# Patient Record
Sex: FEMALE | Race: WHITE | NOT HISPANIC OR LATINO | Employment: UNEMPLOYED | URBAN - METROPOLITAN AREA
[De-identification: names, ages, dates, MRNs, and addresses within clinical notes are randomized per-mention and may not be internally consistent; named-entity substitution may affect disease eponyms.]

---

## 2017-01-03 ENCOUNTER — ALLSCRIPTS OFFICE VISIT (OUTPATIENT)
Dept: OTHER | Facility: OTHER | Age: 62
End: 2017-01-03

## 2017-01-31 ENCOUNTER — GENERIC CONVERSION - ENCOUNTER (OUTPATIENT)
Dept: OTHER | Facility: OTHER | Age: 62
End: 2017-01-31

## 2017-04-10 ENCOUNTER — ALLSCRIPTS OFFICE VISIT (OUTPATIENT)
Dept: OTHER | Facility: OTHER | Age: 62
End: 2017-04-10

## 2017-04-10 ENCOUNTER — HOSPITAL ENCOUNTER (OUTPATIENT)
Dept: RADIOLOGY | Facility: CLINIC | Age: 62
Discharge: HOME | End: 2017-04-10

## 2017-04-10 DIAGNOSIS — M25.561 PAIN IN RIGHT KNEE: ICD-10-CM

## 2017-04-10 DIAGNOSIS — M25.562 PAIN IN LEFT KNEE: ICD-10-CM

## 2017-04-10 DIAGNOSIS — M25.551 PAIN IN RIGHT HIP: ICD-10-CM

## 2017-04-10 DIAGNOSIS — M25.519 PAIN IN SHOULDER: ICD-10-CM

## 2017-04-10 PROCEDURE — 73564 X-RAY EXAM KNEE 4 OR MORE: CPT

## 2017-04-10 PROCEDURE — 73502 X-RAY EXAM HIP UNI 2-3 VIEWS: CPT

## 2017-05-02 ENCOUNTER — ALLSCRIPTS OFFICE VISIT (OUTPATIENT)
Dept: OTHER | Facility: OTHER | Age: 62
End: 2017-05-02

## 2017-05-02 ENCOUNTER — HOSPITAL ENCOUNTER (OUTPATIENT)
Dept: RADIOLOGY | Facility: CLINIC | Age: 62
Discharge: HOME | End: 2017-05-02
Payer: COMMERCIAL

## 2017-05-02 DIAGNOSIS — M25.519 PAIN IN SHOULDER: ICD-10-CM

## 2017-05-02 PROCEDURE — 73030 X-RAY EXAM OF SHOULDER: CPT

## 2017-05-26 ENCOUNTER — ALLSCRIPTS OFFICE VISIT (OUTPATIENT)
Dept: OTHER | Facility: OTHER | Age: 62
End: 2017-05-26

## 2017-05-26 ENCOUNTER — LAB REQUISITION (OUTPATIENT)
Dept: LAB | Facility: HOSPITAL | Age: 62
End: 2017-05-26
Payer: COMMERCIAL

## 2017-05-26 DIAGNOSIS — E03.9 HYPOTHYROIDISM: ICD-10-CM

## 2017-05-26 DIAGNOSIS — I10 ESSENTIAL (PRIMARY) HYPERTENSION: ICD-10-CM

## 2017-05-26 DIAGNOSIS — M79.7 FIBROMYALGIA: ICD-10-CM

## 2017-05-26 DIAGNOSIS — E78.5 HYPERLIPIDEMIA: ICD-10-CM

## 2017-05-26 DIAGNOSIS — J30.9 ALLERGIC RHINITIS: ICD-10-CM

## 2017-05-26 DIAGNOSIS — F32.9 MAJOR DEPRESSIVE DISORDER, SINGLE EPISODE: ICD-10-CM

## 2017-05-26 LAB
25(OH)D3 SERPL-MCNC: 21.5 NG/ML (ref 30–100)
ALBUMIN SERPL BCP-MCNC: 4.6 G/DL (ref 3.5–5)
ALP SERPL-CCNC: 70 U/L (ref 46–116)
ALT SERPL W P-5'-P-CCNC: 32 U/L (ref 12–78)
ANION GAP SERPL CALCULATED.3IONS-SCNC: 4 MMOL/L (ref 4–13)
AST SERPL W P-5'-P-CCNC: 18 U/L (ref 5–45)
BACTERIA UR QL AUTO: ABNORMAL /HPF
BASOPHILS # BLD AUTO: 0.04 THOUSANDS/ΜL (ref 0–0.1)
BASOPHILS NFR BLD AUTO: 1 % (ref 0–1)
BILIRUB SERPL-MCNC: 0.37 MG/DL (ref 0.2–1)
BILIRUB UR QL STRIP: NEGATIVE
BUN SERPL-MCNC: 18 MG/DL (ref 5–25)
CALCIUM ALBUM COR SERPL-MCNC: 9.7 MG/DL (ref 8.3–10.1)
CALCIUM SERPL-MCNC: 10.2 MG/DL (ref 8.3–10.1)
CHLORIDE SERPL-SCNC: 105 MMOL/L (ref 100–108)
CHOLEST SERPL-MCNC: 336 MG/DL (ref 50–200)
CLARITY UR: CLEAR
CO2 SERPL-SCNC: 28 MMOL/L (ref 21–32)
COLOR UR: YELLOW
CREAT SERPL-MCNC: 0.77 MG/DL (ref 0.6–1.3)
EOSINOPHIL # BLD AUTO: 0.51 THOUSAND/ΜL (ref 0–0.61)
EOSINOPHIL NFR BLD AUTO: 7 % (ref 0–6)
ERYTHROCYTE [DISTWIDTH] IN BLOOD BY AUTOMATED COUNT: 13 % (ref 11.6–15.1)
GFR SERPL CREATININE-BSD FRML MDRD: >60 ML/MIN/1.73SQ M
GLUCOSE P FAST SERPL-MCNC: 94 MG/DL (ref 65–99)
GLUCOSE UR STRIP-MCNC: NEGATIVE MG/DL
HCT VFR BLD AUTO: 40 % (ref 34.8–46.1)
HDLC SERPL-MCNC: 51 MG/DL (ref 40–60)
HGB BLD-MCNC: 13.4 G/DL (ref 11.5–15.4)
HGB UR QL STRIP.AUTO: ABNORMAL
HYALINE CASTS #/AREA URNS LPF: ABNORMAL /LPF
KETONES UR STRIP-MCNC: NEGATIVE MG/DL
LDLC SERPL CALC-MCNC: 239 MG/DL (ref 0–100)
LEUKOCYTE ESTERASE UR QL STRIP: ABNORMAL
LYMPHOCYTES # BLD AUTO: 2.09 THOUSANDS/ΜL (ref 0.6–4.47)
LYMPHOCYTES NFR BLD AUTO: 28 % (ref 14–44)
MCH RBC QN AUTO: 31.3 PG (ref 26.8–34.3)
MCHC RBC AUTO-ENTMCNC: 33.5 G/DL (ref 31.4–37.4)
MCV RBC AUTO: 94 FL (ref 82–98)
MONOCYTES # BLD AUTO: 0.83 THOUSAND/ΜL (ref 0.17–1.22)
MONOCYTES NFR BLD AUTO: 11 % (ref 4–12)
NEUTROPHILS # BLD AUTO: 3.87 THOUSANDS/ΜL (ref 1.85–7.62)
NEUTS SEG NFR BLD AUTO: 53 % (ref 43–75)
NITRITE UR QL STRIP: NEGATIVE
NON-SQ EPI CELLS URNS QL MICRO: ABNORMAL /HPF
NRBC BLD AUTO-RTO: 0 /100 WBCS
PH UR STRIP.AUTO: 6 [PH] (ref 4.5–8)
PLATELET # BLD AUTO: 444 THOUSANDS/UL (ref 149–390)
PMV BLD AUTO: 10.5 FL (ref 8.9–12.7)
POTASSIUM SERPL-SCNC: 5 MMOL/L (ref 3.5–5.3)
PROT SERPL-MCNC: 7.7 G/DL (ref 6.4–8.2)
PROT UR STRIP-MCNC: NEGATIVE MG/DL
RBC # BLD AUTO: 4.28 MILLION/UL (ref 3.81–5.12)
RBC #/AREA URNS AUTO: ABNORMAL /HPF
SODIUM SERPL-SCNC: 137 MMOL/L (ref 136–145)
SP GR UR STRIP.AUTO: 1.02 (ref 1–1.03)
T4 FREE SERPL-MCNC: 1.26 NG/DL (ref 0.76–1.46)
TRIGL SERPL-MCNC: 232 MG/DL
TSH SERPL DL<=0.05 MIU/L-ACNC: 0.73 UIU/ML (ref 0.36–3.74)
UROBILINOGEN UR QL STRIP.AUTO: 0.2 E.U./DL
WBC # BLD AUTO: 7.36 THOUSAND/UL (ref 4.31–10.16)
WBC #/AREA URNS AUTO: ABNORMAL /HPF

## 2017-05-26 PROCEDURE — 80061 LIPID PANEL: CPT | Performed by: FAMILY MEDICINE

## 2017-05-26 PROCEDURE — 80053 COMPREHEN METABOLIC PANEL: CPT | Performed by: FAMILY MEDICINE

## 2017-05-26 PROCEDURE — 82306 VITAMIN D 25 HYDROXY: CPT | Performed by: FAMILY MEDICINE

## 2017-05-26 PROCEDURE — 85025 COMPLETE CBC W/AUTO DIFF WBC: CPT | Performed by: FAMILY MEDICINE

## 2017-05-26 PROCEDURE — 84443 ASSAY THYROID STIM HORMONE: CPT | Performed by: FAMILY MEDICINE

## 2017-05-26 PROCEDURE — 84439 ASSAY OF FREE THYROXINE: CPT | Performed by: FAMILY MEDICINE

## 2017-05-26 PROCEDURE — 81001 URINALYSIS AUTO W/SCOPE: CPT | Performed by: FAMILY MEDICINE

## 2017-05-30 ENCOUNTER — GENERIC CONVERSION - ENCOUNTER (OUTPATIENT)
Dept: OTHER | Facility: OTHER | Age: 62
End: 2017-05-30

## 2017-08-28 ENCOUNTER — ALLSCRIPTS OFFICE VISIT (OUTPATIENT)
Dept: OTHER | Facility: OTHER | Age: 62
End: 2017-08-28

## 2017-08-28 DIAGNOSIS — E78.5 HYPERLIPIDEMIA: ICD-10-CM

## 2017-08-31 ENCOUNTER — APPOINTMENT (OUTPATIENT)
Dept: LAB | Facility: HOSPITAL | Age: 62
End: 2017-08-31
Payer: COMMERCIAL

## 2017-08-31 ENCOUNTER — ALLSCRIPTS OFFICE VISIT (OUTPATIENT)
Dept: OTHER | Facility: OTHER | Age: 62
End: 2017-08-31

## 2017-08-31 DIAGNOSIS — E78.5 HYPERLIPIDEMIA: ICD-10-CM

## 2017-08-31 LAB
ALBUMIN SERPL BCP-MCNC: 4.1 G/DL (ref 3.5–5)
ALP SERPL-CCNC: 57 U/L (ref 46–116)
ALT SERPL W P-5'-P-CCNC: 29 U/L (ref 12–78)
AST SERPL W P-5'-P-CCNC: 19 U/L (ref 5–45)
BILIRUB DIRECT SERPL-MCNC: 0.12 MG/DL (ref 0–0.2)
BILIRUB SERPL-MCNC: 0.28 MG/DL (ref 0.2–1)
CHOLEST SERPL-MCNC: 234 MG/DL (ref 50–200)
HDLC SERPL-MCNC: 38 MG/DL (ref 40–60)
LDLC SERPL CALC-MCNC: 157 MG/DL (ref 0–100)
PROT SERPL-MCNC: 7.1 G/DL (ref 6.4–8.2)
TRIGL SERPL-MCNC: 194 MG/DL

## 2017-08-31 PROCEDURE — 36415 COLL VENOUS BLD VENIPUNCTURE: CPT

## 2017-08-31 PROCEDURE — 80076 HEPATIC FUNCTION PANEL: CPT

## 2017-08-31 PROCEDURE — 80061 LIPID PANEL: CPT

## 2017-09-01 ENCOUNTER — GENERIC CONVERSION - ENCOUNTER (OUTPATIENT)
Dept: OTHER | Facility: OTHER | Age: 62
End: 2017-09-01

## 2018-01-10 NOTE — RESULT NOTES
Discussion/Summary   Phone call to patient discussed lab results  Cholesterol significantly elevated at 336 and triglycerides are mildly elevated at 232  Vitamin D is decreased  Recommend patient start Lipitor 20 mg daily and increase vitamin D an additional 1000 international units daily  Recheck fasting labs next appointment  Verified Results  (1) CBC/PLT/DIFF 16GIT1423 11:45AM Aniya Champion Order Number: [id number]     Test Name Result Flag Reference   WBC COUNT 7 36 Thousand/uL  4 31-10 16   RBC COUNT 4 28 Million/uL  3 81-5 12   HEMOGLOBIN 13 4 g/dL  11 5-15 4   HEMATOCRIT 40 0 %  34 8-46  1   MCV 94 fL  82-98   MCH 31 3 pg  26 8-34 3   MCHC 33 5 g/dL  31 4-37 4   RDW 13 0 %  11 6-15 1   MPV 10 5 fL  8 9-12 7   PLATELET COUNT 149 Thousands/uL H 149-390   nRBC AUTOMATED 0 /100 WBCs     NEUTROPHILS RELATIVE PERCENT 53 %  43-75   LYMPHOCYTES RELATIVE PERCENT 28 %  14-44   MONOCYTES RELATIVE PERCENT 11 %  4-12   EOSINOPHILS RELATIVE PERCENT 7 % H 0-6   BASOPHILS RELATIVE PERCENT 1 %  0-1   NEUTROPHILS ABSOLUTE COUNT 3 87 Thousands/? ??L  1 85-7 62   LYMPHOCYTES ABSOLUTE COUNT 2 09 Thousands/? ??L  0 60-4 47   MONOCYTES ABSOLUTE COUNT 0 83 Thousand/? ??L  0 17-1 22   EOSINOPHILS ABSOLUTE COUNT 0 51 Thousand/? ??L  0 00-0 61   BASOPHILS ABSOLUTE COUNT 0 04 Thousands/? ??L  0 00-0 10     (1) COMPREHENSIVE METABOLIC PANEL 47TQA0301 51:08DM Aniya Champion Order Number: [id number]     Test Name Result Flag Reference   SODIUM 137 mmol/L  136-145   POTASSIUM 5 0 mmol/L  3 5-5 3   CHLORIDE 105 mmol/L  100-108   CARBON DIOXIDE 28 mmol/L  21-32   ANION GAP (CALC) 4 mmol/L  4-13   BLOOD UREA NITROGEN 18 mg/dL  5-25   CREATININE 0 77 mg/dL  0 60-1 30   Standardized to IDMS reference method   CALCIUM 10 2 mg/dL H 8 3-10 1   BILI, TOTAL 0 37 mg/dL  0 20-1 00   ALK PHOSPHATAS 70 U/L     ALT (SGPT) 32 U/L  12-78   AST(SGOT) 18 U/L  5-45   ALBUMIN 4 6 g/dL  3 5-5 0   TOTAL PROTEIN 7 7 g/dL 6  4-8 2   eGFR Non-African American      >60 0 ml/min/1 73sq m   [facility] Disease Education Program recommendations are as follows:  GFR calculation is accurate only with a steady state creatinine  Chronic Kidney disease less than 60 ml/min/1 73 sq  meters  Kidney failure less than 15 ml/min/1 73 sq  meters  CORRECTED CALCIUM 9 7 mg/dL  8 3-10 1   GLUCOSE FASTING 94 mg/dL  65-99     (1) LIPID PANEL, FASTING 26KML7564 11:45AM Comfort Bryant Order Number: [id number]     Test Name Result Flag Reference   CHOLESTEROL 336 mg/dL H    HDL,DIRECT 51 mg/dL  40-60   Specimen collection should occur prior to Metamizole administration due to the potential for falsely depressed results  LDL CHOLESTEROL CALCULATED 239 mg/dL H 0-100   Triglyceride:         Normal              <150 mg/dl       Borderline High    150-199 mg/dl       High               200-499 mg/dl       Very High          >499 mg/dl  Cholesterol:         Desirable        <200 mg/dl      Borderline High  200-239 mg/dl      High             >239 mg/dl  HDL Cholesterol:        High    >59 mg/dL      Low     <41 mg/dL  LDL CALCULATED:    This screening LDL is a calculated result  It does not have the accuracy of the Direct Measured LDL in the monitoring of patients with hyperlipidemia and/or statin therapy  Direct Measure LDL (FIW382) must be ordered separately in these patients  TRIGLYCERIDES 232 mg/dL H <=150   Specimen collection should occur prior to N-Acetylcysteine or Metamizole administration due to the potential for falsely depressed results       (1) T4, FREE 66VGM6426 11:45AM Comfort Bryant Order Number: [id number]     Test Name Result Flag Reference   T4,FREE 1 26 ng/dL  0 76-1 46     (1) TSH 02MNK6771 11:45AM Comfort Bryant Order Number: [id number]     Test Name Result Flag Reference   TSH 0 735 uIU/mL  0 358-3 740   Patients undergoing fluorescein dye angiography may retain small amounts of fluorescein in the body for 48-72 hours post procedure  Samples containing fluorescein can produce falsely depressed TSH values  If the patient had this procedure,a specimen should be resubmitted post fluorescein clearance  The recommended reference ranges for TSH during pregnancy are as follows:  First trimester 0 1 to 2 5 uIU/mL  Second trimester  0 2 to 3 0 uIU/mL  Third trimester 0 3 to 3 0 uIU/m     (1) VITAMIN D 25-HYDROXY 25YBL8402 11:45AM [employer] Order Number: [id number]     Test Name Result Flag Reference   VIT D 25-HYDROX 21 5 ng/mL L 30 0-100 0   This assay is a certified procedure of the CDC Vitamin D Standardization Certification Program (VDSCP)     Deficiency <20ng/ml   Insufficiency 20-30ng/ml   Sufficient  ng/ml     *Patients undergoing fluorescein dye angiography may retain small amounts of fluorescein in the body for 48-72 hours post procedure  Samples containing fluorescein can produce falsely elevated Vitamin D values  If the patient had this procedure, a specimen should be resubmitted post fluorescein clearance       (1) URINALYSIS w URINE C/S REFLEX (will reflex a microscopy if leukocytes, occult blood, or nitrites are not within normal limits) 60OYM1254 11:45AM [employer] Order Number: [id number]     Test Name Result Flag Reference   COLOR Yellow     CLARITY Clear     PH UA 6 0  4 5-8 0   LEUKOCYTE ESTERASE UA Small A Negative   NITRITE UA Negative  Negative   PROTEIN UA Negative mg/dl  Negative   GLUCOSE UA Negative mg/dl  Negative   KETONES UA Negative mg/dl  Negative   UROBILINOGEN UA 0 2 E U /dl  0 2, 1 0 E U /dl   BILIRUBIN UA Negative  Negative   BLOOD UA Small A Negative   SPECIFIC GRAVITY UA 1 020  1 003-1 030   BACTERIA None Seen /hpf  None Seen, Occasional   EPITHELIAL CELLS None Seen /hpf  None Seen, Occasional   HYALINE CASTS None Seen /lpf  None Seen   RBC UA 4-10 /hpf A None Seen   WBC UA 4-10 /hpf A None Seen Plan  Hyperlipidemia    · Atorvastatin Calcium 20 MG Oral Tablet; TAKE 1 TABLET DAILY

## 2018-01-12 VITALS
DIASTOLIC BLOOD PRESSURE: 86 MMHG | SYSTOLIC BLOOD PRESSURE: 142 MMHG | RESPIRATION RATE: 16 BRPM | BODY MASS INDEX: 32.78 KG/M2 | TEMPERATURE: 98.4 F | HEART RATE: 72 BPM | WEIGHT: 200 LBS

## 2018-01-12 VITALS
HEIGHT: 66 IN | BODY MASS INDEX: 32.38 KG/M2 | SYSTOLIC BLOOD PRESSURE: 132 MMHG | DIASTOLIC BLOOD PRESSURE: 76 MMHG | WEIGHT: 201.5 LBS | HEART RATE: 78 BPM

## 2018-01-12 NOTE — RESULT NOTES
Message  PPD placed 8/28/2017  PPD read:  8/31/2017-Negative        Plan  Hyperlipidemia    · (1) HEPATIC FUNCTION PANEL; Status: In Progress - Specimen/Data Collected;   Done:  40SUL4465   · (1) LIPID PANEL, FASTING; Status: In Progress - Specimen/Data Collected;   Done:  05Elc1715  Physical examination of employee    · Administered: PPD    Signatures   Electronically signed by : Dennis Carrion, ; Aug 31 2017  8:24AM EST                       (Author)

## 2018-01-13 VITALS
DIASTOLIC BLOOD PRESSURE: 79 MMHG | BODY MASS INDEX: 32.32 KG/M2 | WEIGHT: 201.13 LBS | SYSTOLIC BLOOD PRESSURE: 144 MMHG | HEART RATE: 67 BPM | HEIGHT: 66 IN

## 2018-01-13 NOTE — PROGRESS NOTES
Assessment    1  Hypertension (401 9) (I10)   2  Hyperlipidemia (272 4) (E78 5)   3  Hypothyroidism (244 9) (E03 9)   4  Fibromyalgia (729 1) (M79 7)   5  Depression (311) (F32 9)   6  Insomnia (780 52) (G47 00)    Plan  Health Maintenance    · Renae Ericksonchris Lincoln  (Obstetrics/Gynecology) Physician Referral  Consult  Status: Hold  For - Scheduling  Requested for: 20Jan2016  Care Summary provided  : Yes  Hyperlipidemia    · A diet that is low in fat, cholesterol, and sodium is considered a cardiac diet ;  Status:Complete;   Done: 41HRL4446   · Eat no more than 30 grams of fat per day ; Status:Complete;   Done: 39EJT3216  Hypertension    · Begin a limited exercise program ; Status:Complete;   Done: 04KCG0648   · Continue with our present treatment plan ; Status:Complete;   Done: 39EZX0937   · Eat a low fat and low cholesterol diet ; Status:Complete;   Done: 73TLH0112   · Keep a diary of when and what you eat ; Status:Complete;   Done: 03RHC9923   · Restrict the salt in your diet by avoiding highly salted foods ; Status:Complete;   Done:  62PGV1317   · Restrict your sodium (salt) intake to 2 grams per day ; Status:Complete;   Done:  78HOQ8677   · Take your blood pressure twice a week  Record the numbers and bring them with you to  your appointments ; Status:Complete;   Done: 71WEV3675   · There are many exercise options for seniors ; Status:Complete;   Done: 80TOM9203   · Call [phone] if: You become dizzy or lightheaded, especially when you stand up  after sitting for a while ; Status:Complete;   Done: 70OSI8246   · Call [phone] if: Your blood pressure is frequently higher than 140/90 ;  Status:Complete;   Done: 66HFJ8887   · Call [phone] if: You experience a new kind of chest pain (angina) or pressure ;  Status:Complete;   Done: 42CRS7375    Discussion/Summary  Discussion Summary:   Patient given prescription for fasting labs to include CBC, CMP, lipid profile, TSH, free T4, vitamin D and UA   Patient being referred to Dr Carolanne Apgar, gynecologist for routine exam  Patient to continue present treatment  She is instructed to follow a low-fat and a low-salt diet continue her regular exercise walking 4 days a week for 40 minutes  Patient return the office in 6 months  History of Present Illness  HPI: Patient is a 70-year-old female who is a new patient to our practice being seen at her initial visit  She recently moved back to this area in 2015 from [place] and has been a patient of Dr Jaja Vargas at [facility] since  Patient last had fasting labs proximal was 6 months ago  Patient has a history of hypertension the past 20 years, fibromyalgia the past 30 years and hypothyroidism for the past 2 years  Patient has hyperlipidemia increase and treated with Lipitor  Patient also suffers from depression and insomnia as well as arthritis  Patient previously followed with her rheumatologist but not recently  Patient does see gynecologist yearly and requests referral to one this time and for her yearly mammogram  Patient last colonoscopy was in   Patient did receive yearly flu vaccine in 2015, had Pneumovax in  and Zostavax in 2015  Patient admits to chronic low back pain and bilateral hip pain  She continues to walk 4 days a week for 40 minutes  Review of Systems  Complete-Female:   Constitutional: feeling tired  Cardiovascular: no chest pain, no intermittent leg claudication, no palpitations and no lower extremity edema  Respiratory: no shortness of breath and no shortness of breath during exertion  Gastrointestinal: Occasional heartburn, but No complaints of abdominal pain, no constipation, no nausea or vomiting, no diarrhea, no bloody stools  Genitourinary: Positive urinary urgency and slight incontinence at night , but no dysuria  Musculoskeletal: arthralgias  Neurological: no headache, no dizziness and no fainting        Vitals  Vital Signs Luis Alberto Solano Includes: Current Encounter]    Recorded: 19ZQD5013 03:12PM   Temperature 98 3 F   Heart Rate 60   Respiration 16   Systolic 075   Diastolic 72   Height 5 ft 6 in   Weight 200 lb    BMI Calculated 32 28   BSA Calculated 2     Physical Exam    Constitutional   General appearance: No acute distress, well appearing and well nourished  Eyes   Conjunctiva and lids: No swelling, erythema or discharge  Ears, Nose, Mouth, and Throat   External inspection of ears and nose: Normal     Otoscopic examination: Tympanic membranes translucent with normal light reflex  Canals patent without erythema  Nasal mucosa, septum, and turbinates: Normal without edema or erythema  Oropharynx: Normal with no erythema, edema, exudate or lesions  Pulmonary   Respiratory effort: No increased work of breathing or signs of respiratory distress  Auscultation of lungs: Clear to auscultation  Cardiovascular   Auscultation of heart: Normal rate and rhythm, normal S1 and S2, without murmurs  Examination of extremities for edema and/or varicosities: Normal     Carotid pulses: Normal     Abdomen   Abdomen: Non-tender, no masses  Lymphatic   Palpation of lymph nodes in neck: No lymphadenopathy  Musculoskeletal   Gait and station: Normal     Inspection/palpation of joints, bones, and muscles: Normal     Skin   Skin and subcutaneous tissue: Normal without rashes or lesions      Psychiatric   Orientation to person, place, and time: Normal     Mood and affect: Normal          Signatures   Electronically signed by : CHIQUITA Betancourt DO; Jan 20 2016  3:19PM EST                       (Author)

## 2018-01-15 VITALS
BODY MASS INDEX: 31.98 KG/M2 | RESPIRATION RATE: 16 BRPM | WEIGHT: 199 LBS | HEART RATE: 72 BPM | HEIGHT: 66 IN | SYSTOLIC BLOOD PRESSURE: 138 MMHG | TEMPERATURE: 99.8 F | DIASTOLIC BLOOD PRESSURE: 70 MMHG

## 2018-01-15 NOTE — PROGRESS NOTES
Assessment    1  Physical examination of employee (V70 5) (Z02 89)    Plan  Hyperlipidemia    · (1) HEPATIC FUNCTION PANEL; Status:Active; Requested for:00Opd1749;    · (1) LIPID PANEL, FASTING; Status:Active; Requested for:06Acz6848;   Physical examination of employee    · PPD; INJECT 0 1  ML Intradermal; To Be Done: 12Tql7557    Discussion/Summary  health maintenance visit healthy adult female Currently, she eats an adequate diet and has an inadequate exercise regimen  The risks and benefits of immunizations were discussed  Advice and education were given regarding nutrition, aerobic exercise, weight bearing exercise and weight loss  PPD skin test applied  Form completed for physical exam  Patient to return to the office in 48-72 hours to read PPD skin test and due for fasting lipid and liver profile  Possible side effects of new medications were reviewed with the patient/guardian today  The treatment plan was reviewed with the patient/guardian  The patient/guardian understands and agrees with the treatment plan      Chief Complaint  Work Physical      History of Present Illness  HM, Adult Female: The patient is being seen for a health maintenance evaluation  General Health: The patient's health since the last visit is described as good  She has regular dental visits  She denies vision problems  Vision care includes wearing glasses  She has hearing loss  hearing is slightly decreased  Immunizations status: up to date  Lifestyle:  She does not have a healthy diet  She has weight concerns  She does not exercise regularly  She does not use tobacco  She consumes alcohol  She reports occasional alcohol use  She denies drug use  Reproductive health: the patient is postmenopausal    Screening: cancer screening reviewed and current  metabolic screening reviewed and current  HPI: 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       Active Problems    1   Acute bronchitis (466 0) (J20 9)   2  Allergic rhinitis (477 9) (J30 9)   3  Arthritis (716 90) (M19 90)   4  Colon cancer screening (V76 51) (Z12 11)   5  Depression (311) (F32 9)   6  Eczema (692 9) (L30 9)   7  Fibromyalgia (729 1) (M79 7)   8  Glenohumeral arthritis, left (715 91) (M19 012)   9  Hyperlipidemia (272 4) (E78 5)   10  Hypertension (401 9) (I10)   11  Hypothyroidism (244 9) (E03 9)   12  Insomnia (780 52) (G47 00)   13  Left knee pain (719 46) (M25 562)   14  Primary localized osteoarthritis of left knee (715 16) (M17 12)   15  Primary localized osteoarthritis of right knee (715 16) (M17 11)   16  Right hip pain (719 45) (M25 551)   17  Right knee pain (719 46) (M25 561)   18  Screening breast examination (V76 10) (Z12 31)   19  Shoulder pain (719 41) (M25 519)   20  Subacromial bursitis (726 19) (M75 50)    Past Medical History    · Depression (311) (F32 9)   · History of thyroid disease (V12 29) (Z86 39)   · Hypertension (401 9) (I10)    Surgical History    · History of Peripheral Nerve Block Wrist Median Left   · History of Spine Repair    Family History  Mother    · Family history of lung cancer (V16 1) (Z80 1)  Father    · Family history of skin cancer (V16 8) (Z80 8)    Social History    · Former smoker (V15 82) (N26 654)    Current Meds   1  Atorvastatin Calcium 20 MG Oral Tablet; TAKE 1 TABLET DAILY; Therapy: 98XTD2658 to ([facility])  Requested for: 37EWL3394; Last   Rx:75Rbg9692 Ordered   2  Azelastine HCl - 0 15 % Nasal Solution; INSERT 2 SQUIRTS IN EACH NOSTRIL TWICE   DAILY; Therapy: 12OPF1480 to (Last Rx:21Nxb9916)  Requested for: 39ENA7572 Ordered   3  Diclofenac Sodium 75 MG Oral Tablet Delayed Release; TAKE 1 TABLET BY MOUTH   TWICE A DAY AS NEEDED FOR PAIN with food; Therapy: 45Oba5259 to (Evaluate:47Gvl2567)  Requested for: 46UFM1573; Last   Rx:87Faf4443 Ordered   4  Fenofibrate 160 MG Oral Tablet; take 1 tablet every day;    Therapy: 31RUR9871 to (Evaluate:62Xcs0834) Requested for: 67Pxt7316; Last   Rx:21May2017; Status: ACTIVE - Renewal Denied Ordered   5  Levothyroxine Sodium 75 MCG Oral Tablet; take 1 tablet by mouth once daily; Therapy: 82FWM9803 to (NKPCHFED:54ZGK9894)  Requested for: 14WTF3904; Last   Rx:08Eto9546 Ordered   6  Lisinopril 10 MG Oral Tablet; take 1 tablet every day; Therapy: 87OGI8248 to (Evaluate:19Aug2017)  Requested for: 27Lvq7169; Last   Rx:21May2017; Status: ACTIVE - Renewal Denied Ordered   7  Lyrica 75 MG Oral Capsule; TAKE 1 CAPSULE TWICE DAILY; Therapy: 00WXO7074 to (Evaluate:02Nov2017); Last Rx:04Aug2017 Ordered   8  Omeprazole 20 MG Oral Capsule Delayed Release; TAKE 1 CAPSULE EVERY DAY; Therapy: 71HMZ1819 to ([facility])  Requested for: 88SPL0918; Last   Rx:10May2017 Ordered   9  Rosuvastatin Calcium 10 MG Oral Tablet; TAKE 1 TABLET BY MOUTH ONCE DAILY; Therapy: 10CGL7369 to (Last Rx:05Jun2017)  Requested for: 58VJG9971 Ordered   10  Stool Softener TABS; Therapy: (Recorded:20Jan2016) to Recorded   11  TraMADol HCl - 50 MG Oral Tablet; Take 1 tablet every 6 hours as needed for pain; Therapy: 59QMF3058 to (Evaluate:28Aug2017)  Requested for: 25Hdm6534; Last    Rx:11Mky4978 Ordered   12  Triamcinolone Acetonide 0 5 % External Cream; APPLY TO THE AFFECTED AREAS 3    TIMES DAILY AS NEEDED; Therapy: 08Apr2016 to (Last Rx:12Oct2016)  Requested for: 12Oct2016 Ordered   13  Venlafaxine HCl  MG Oral Capsule Extended Release 24 Hour; take 1 capsule    daily; Therapy: 63LSP7695 to (Evaluate:13Nov2017)  Requested for: 21WCN2259; Last    Rx:89Fxh1358 Ordered   14  Vitamin B-12 TABS; Therapy: (Recorded:10Apr2017) to Recorded   15  Vitamin D 1000 UNIT CAPS; Therapy: (Recorded:73Aps8371) to Recorded   16  Zolpidem Tartrate 10 MG Oral Tablet; TAKE 1 TABLET AT  BEDTIME AS NEEDED FOR    INSOMNIA; Last Rx:46Ygz5143 Ordered    Allergies    1   No Known Drug Allergies    Vitals   Recorded: 41Hbz3371 05:03PM Recorded: 28Aug2017 04: 40PM   Temperature  97 7 F   Heart Rate 72    Respiration 16    Systolic  157   Diastolic  78   Height  5 ft 5 5 in   Weight  204 lb    BMI Calculated  33 43   BSA Calculated  2 01     Physical Exam    Constitutional   General appearance: No acute distress, well appearing and well nourished  Eyes   Conjunctiva and lids: No swelling, erythema or discharge  Ears, Nose, Mouth, and Throat   External inspection of ears and nose: Normal     Otoscopic examination: Tympanic membranes translucent with normal light reflex  Canals patent without erythema  Oropharynx: Normal with no erythema, edema, exudate or lesions  Pulmonary   Respiratory effort: No increased work of breathing or signs of respiratory distress  Auscultation of lungs: Clear to auscultation  Cardiovascular   Auscultation of heart: Normal rate and rhythm, normal S1 and S2, without murmurs  Examination of extremities for edema and/or varicosities: Normal     Abdomen   Abdomen: Non-tender, no masses  Lymphatic   Palpation of lymph nodes in neck: No lymphadenopathy  Musculoskeletal   Gait and station: Normal     Inspection/palpation of joints, bones, and muscles: Normal     Skin   Skin and subcutaneous tissue: Normal without rashes or lesions  Psychiatric   Orientation to person, place, and time: Normal     Mood and affect: Normal        Health Management  Colon cancer screening   COLONOSCOPY; every 10 years; Next Due: 26YWC0422; Overdue  Screening breast examination   MAMMO DIAGNOSTIC BILATERAL W CAD; every 1 year; Last 12QCC5707; Next Due:  80YGO6920;  Overdue    Signatures   Electronically signed by : Robbie Renteria DO; Aug 28 2017  5:05PM EST                       (Author)

## 2018-01-15 NOTE — MISCELLANEOUS
Message   Recorded as Task   Date: 03/02/2016 02:53 PM, Created By: Lana Alicia   Task Name: Call Back   Assigned To: Amalia Stanford   Regarding Patient: Trae Vega, Status: Active   Comment:    Enid Lopez - 02 Mar 2016 2:53 PM     TASK CREATED  Caller: Self; Results Inquiry; [phone] (Mobile Phone); [phone] (Home)  pt notified of results and recommendations  She states you had discussed with her changing her thyroid medication  Do you still want to do this? Amalia Stanford - 04 Mar 2016 8:53 AM     TASK EDITED  Phone call to patient  She complains of feeling fatigued and sweaty and has been taking [place] thyroid for about 1 year  Recommend she switch to Synthroid 75 ÃÂµg daily and repeat TSH and free T4 in 6 weeks          Signatures   Electronically signed by : CHIQUITA Haro D ,DO; Mar  4 2016  8:53AM EST                       (Author)

## 2018-01-18 NOTE — RESULT NOTES
Verified Results  (1) LIPID PANEL, FASTING [id number] 09:01AM Jess Has     Test Name Result Flag Reference   CHOLESTEROL, TOTAL 254 mg/dL H 125-200   HDL CHOLESTEROL 58 mg/dL  > OR = 46   TRIGLICERIDES 745 mg/dL  <150   LDL-CHOLESTEROL 169 mg/dL (calc) H <130   Desirable range <100 mg/dL for patients with CHD or  diabetes and <70 mg/dL for diabetic patients with  known heart disease  CHOL/HDLC RATIO 4 4 (calc)  < OR = 5 0   NON HDL CHOLESTEROL 196 mg/dL (calc) H    Target for non-HDL cholesterol is 30 mg/dL higher than   LDL cholesterol target  (1) COMPREHENSIVE METABOLIC PANEL [id number] 97:26FN Jess Has     Test Name Result Flag Reference   GLUCOSE 113 mg/dL H 65-99   Fasting reference interval   UREA NITROGEN (BUN) 21 mg/dL  7-25   CREATININE 0 72 mg/dL  0 50-0 99   For patients >52years of age, the reference limit  for Creatinine is approximately 13% higher for people  identified as -American  eGFR NON-AFR   AMERICAN 90 mL/min/1 73m2  > OR = 60   eGFR AFRICAN AMERICAN 105 mL/min/1 73m2  > OR = 60   BUN/CREATININE RATIO   7-61   NOT APPLICABLE (calc)   SODIUM 141 mmol/L  135-146   POTASSIUM 4 5 mmol/L  3 5-5 3   CHLORIDE 105 mmol/L     CARBON DIOXIDE 27 mmol/L  19-30   CALCIUM 10 0 mg/dL  8 6-10 4   PROTEIN, TOTAL 7 2 g/dL  6 1-8 1   ALBUMIN 4 8 g/dL  3 6-5 1   GLOBULIN 2 4 g/dL (calc)  1 9-3 7   ALBUMIN/GLOBULIN RATIO 2 0 (calc)  1 0-2 5   BILIRUBIN, TOTAL 0 4 mg/dL  0 2-1 2   ALKALINE PHOSPHATASE 51 U/L     AST 22 U/L  10-35   ALT 25 U/L  6-29     (1) CBC/PLT/DIFF [id number] 09:01AM Jess Has     Test Name Result Flag Reference   WHITE BLOOD CELL COUNT 4 9 Thousand/uL  3 8-10 8   RED BLOOD CELL COUNT 4 23 Million/uL  3 80-5 10   HEMOGLOBIN 12 7 g/dL  11 7-15 5   HEMATOCRIT 38 8 %  35 0-45 0   MCV 91 6 fL  80 0-100 0   MCH 30 0 pg  27 0-33 0   MCHC 32 8 g/dL  32 0-36 0   RDW 13 4 %  11 0-15 0   PLATELET COUNT 492 Thousand/uL  140-400   MPV 8 4 fL  7 5-11 5 ABSOLUTE NEUTROPHILS 2822 cells/uL  2207-8760   ABSOLUTE LYMPHOCYTES 1509 cells/uL  850-3900   ABSOLUTE MONOCYTES 368 cells/uL  200-950   ABSOLUTE EOSINOPHILS 176 cells/uL     ABSOLUTE BASOPHILS 25 cells/uL  0-200   NEUTROPHILS 57 6 %     LYMPHOCYTES 30 8 %     MONOCYTES 7 5 %     EOSINOPHILS 3 6 %     BASOPHILS 0 5 %       (1) T4, FREE [id number] 09:01AM Rishabh Parks     Test Name Result Flag Reference   T4, FREE 0 8 ng/dL  0 8-1 8     (Q) TSH, 3RD GENERATION [id number] 09:01AM Rishabh Parks     Test Name Result Flag Reference   TSH 3 69 mIU/L  0 40-4 50     *(Q) VITAMIN D, 25-HYDROXY, LC/MS/MS [id number] 09:01AM Rishabh Parks     Test Name Result Flag Reference   VITAMIN D, 25-OH, TOTAL 34 ng/mL     Vitamin D Status         25-OH Vitamin D:     Deficiency:                    <20 ng/mL  Insufficiency:             20 - 29 ng/mL  Optimal:                 > or = 30 ng/mL     For 25-OH Vitamin D testing on patients on   D2-supplementation and patients for whom quantitation   of D2 and D3 fractions is required, the QuestAssureD(TM)  25-OH VIT D, (D2,D3), LC/MS/MS is recommended: order   code 87335 (patients >2yrs)  For more information on this test, go to:  http://Urbita/faq/SPC689  (This link is being provided for   informational/educational purposes only )     (Q) URINALYSIS REFLEX [id number] 09:01AM Rishabh Parks   REPORT COMMENT:  FASTING:YES     Test Name Result Flag Reference   COLOR YELLOW  YELLOW   APPEARANCE CLEAR  CLEAR   SPECIFIC GRAVITY 1 015  1 001-1 035   PH 5 0  5 0-8 0   GLUCOSE NEGATIVE  NEGATIVE   BILIRUBIN NEGATIVE  NEGATIVE   KETONES NEGATIVE  NEGATIVE   OCCULT BLOOD NEGATIVE  NEGATIVE   PROTEIN NEGATIVE  NEGATIVE   NITRITE NEGATIVE  NEGATIVE   LEUKOCYTE ESTERASE NEGATIVE  NEGATIVE       Discussion/Summary   Cholesterol is elevated and fasting blood sugar is slightly elevated   Recommend patient resume Lipitor and continue present treatment and follow a low-fat/low-cholesterol and a low sugar/carbohydrate diet more carefully  Other labs okay

## 2018-01-22 VITALS
SYSTOLIC BLOOD PRESSURE: 130 MMHG | HEIGHT: 66 IN | TEMPERATURE: 97.7 F | WEIGHT: 204 LBS | HEART RATE: 72 BPM | DIASTOLIC BLOOD PRESSURE: 78 MMHG | RESPIRATION RATE: 16 BRPM | BODY MASS INDEX: 32.78 KG/M2

## 2018-01-31 DIAGNOSIS — M19.90 OSTEOARTHRITIS, UNSPECIFIED OSTEOARTHRITIS TYPE, UNSPECIFIED SITE: Primary | ICD-10-CM

## 2018-01-31 RX ORDER — DICLOFENAC SODIUM 75 MG/1
TABLET, DELAYED RELEASE ORAL
Qty: 60 TABLET | Refills: 0 | Status: SHIPPED | OUTPATIENT
Start: 2018-01-31 | End: 2018-04-23 | Stop reason: SDUPTHER

## 2018-02-25 DIAGNOSIS — F32.A DEPRESSION, UNSPECIFIED DEPRESSION TYPE: Primary | ICD-10-CM

## 2018-02-25 PROBLEM — J30.9 ALLERGIC RHINITIS: Status: ACTIVE | Noted: 2017-05-26

## 2018-02-25 PROBLEM — M19.012 GLENOHUMERAL ARTHRITIS, LEFT: Status: ACTIVE | Noted: 2017-05-02

## 2018-02-25 PROBLEM — M17.12 PRIMARY LOCALIZED OSTEOARTHRITIS OF LEFT KNEE: Status: ACTIVE | Noted: 2017-04-10

## 2018-02-25 PROBLEM — M17.11 PRIMARY LOCALIZED OSTEOARTHRITIS OF RIGHT KNEE: Status: ACTIVE | Noted: 2017-04-10

## 2018-02-25 RX ORDER — VENLAFAXINE HYDROCHLORIDE 150 MG/1
CAPSULE, EXTENDED RELEASE ORAL
Qty: 90 CAPSULE | Refills: 0 | Status: SHIPPED | OUTPATIENT
Start: 2018-02-25 | End: 2018-03-06 | Stop reason: SDUPTHER

## 2018-03-06 DIAGNOSIS — F32.A DEPRESSION, UNSPECIFIED DEPRESSION TYPE: ICD-10-CM

## 2018-03-06 DIAGNOSIS — M79.7 FIBROMYALGIA: Primary | ICD-10-CM

## 2018-03-06 DIAGNOSIS — I10 ESSENTIAL HYPERTENSION: ICD-10-CM

## 2018-03-06 RX ORDER — LISINOPRIL 10 MG/1
10 TABLET ORAL DAILY
Qty: 90 TABLET | Refills: 0 | Status: SHIPPED | OUTPATIENT
Start: 2018-03-06 | End: 2018-06-11 | Stop reason: SDUPTHER

## 2018-03-06 RX ORDER — TRAMADOL HYDROCHLORIDE 50 MG/1
50 TABLET ORAL EVERY 6 HOURS PRN
Qty: 30 TABLET | Refills: 0 | Status: SHIPPED | OUTPATIENT
Start: 2018-03-06 | End: 2018-04-09 | Stop reason: SDUPTHER

## 2018-03-06 RX ORDER — TRAMADOL HYDROCHLORIDE 50 MG/1
50 TABLET ORAL EVERY 6 HOURS PRN
Refills: 2 | COMMUNITY
Start: 2018-01-27 | End: 2018-03-06 | Stop reason: SDUPTHER

## 2018-03-06 RX ORDER — VENLAFAXINE HYDROCHLORIDE 150 MG/1
150 CAPSULE, EXTENDED RELEASE ORAL DAILY
Qty: 90 CAPSULE | Refills: 0 | Status: SHIPPED | OUTPATIENT
Start: 2018-03-06 | End: 2018-06-18 | Stop reason: SDUPTHER

## 2018-03-06 RX ORDER — LISINOPRIL 10 MG/1
1 TABLET ORAL DAILY
COMMUNITY
Start: 2016-11-20 | End: 2018-03-06 | Stop reason: SDUPTHER

## 2018-03-06 RX ORDER — PREGABALIN 75 MG/1
1 CAPSULE ORAL 2 TIMES DAILY
COMMUNITY
Start: 2016-03-04 | End: 2018-03-06 | Stop reason: SDUPTHER

## 2018-03-06 RX ORDER — PREGABALIN 75 MG/1
75 CAPSULE ORAL 2 TIMES DAILY
Qty: 180 CAPSULE | Refills: 0 | Status: SHIPPED | OUTPATIENT
Start: 2018-03-06 | End: 2019-10-07

## 2018-04-09 DIAGNOSIS — M79.7 FIBROMYALGIA: ICD-10-CM

## 2018-04-09 RX ORDER — TRAMADOL HYDROCHLORIDE 50 MG/1
50 TABLET ORAL EVERY 6 HOURS PRN
Qty: 30 TABLET | Refills: 0 | Status: SHIPPED | OUTPATIENT
Start: 2018-04-09 | End: 2018-06-11 | Stop reason: SDUPTHER

## 2018-04-23 DIAGNOSIS — M19.90 OSTEOARTHRITIS, UNSPECIFIED OSTEOARTHRITIS TYPE, UNSPECIFIED SITE: ICD-10-CM

## 2018-04-23 RX ORDER — DICLOFENAC SODIUM 75 MG/1
TABLET, DELAYED RELEASE ORAL
Qty: 180 TABLET | Refills: 0 | Status: SHIPPED | OUTPATIENT
Start: 2018-04-23 | End: 2018-09-20 | Stop reason: SDUPTHER

## 2018-04-23 RX ORDER — DICLOFENAC SODIUM 75 MG/1
TABLET, DELAYED RELEASE ORAL
Qty: 60 TABLET | Refills: 0 | Status: SHIPPED | OUTPATIENT
Start: 2018-04-23 | End: 2018-04-23 | Stop reason: SDUPTHER

## 2018-06-11 DIAGNOSIS — M79.7 FIBROMYALGIA: ICD-10-CM

## 2018-06-11 DIAGNOSIS — I10 ESSENTIAL HYPERTENSION: ICD-10-CM

## 2018-06-11 RX ORDER — TRAMADOL HYDROCHLORIDE 50 MG/1
50 TABLET ORAL EVERY 6 HOURS PRN
Qty: 30 TABLET | Refills: 0 | Status: SHIPPED | OUTPATIENT
Start: 2018-06-11 | End: 2018-08-13 | Stop reason: SDUPTHER

## 2018-06-11 RX ORDER — LISINOPRIL 10 MG/1
10 TABLET ORAL DAILY
Qty: 90 TABLET | Refills: 0 | Status: SHIPPED | OUTPATIENT
Start: 2018-06-11 | End: 2018-09-20 | Stop reason: SDUPTHER

## 2018-06-18 DIAGNOSIS — F32.A DEPRESSION, UNSPECIFIED DEPRESSION TYPE: ICD-10-CM

## 2018-06-18 RX ORDER — VENLAFAXINE HYDROCHLORIDE 150 MG/1
150 CAPSULE, EXTENDED RELEASE ORAL DAILY
Qty: 90 CAPSULE | Refills: 3 | Status: SHIPPED | OUTPATIENT
Start: 2018-06-18 | End: 2019-07-22 | Stop reason: SDUPTHER

## 2018-07-09 DIAGNOSIS — E03.9 HYPOTHYROIDISM, UNSPECIFIED TYPE: Primary | ICD-10-CM

## 2018-07-09 DIAGNOSIS — G47.00 INSOMNIA, UNSPECIFIED TYPE: ICD-10-CM

## 2018-07-09 RX ORDER — ZOLPIDEM TARTRATE 10 MG/1
1 TABLET ORAL
COMMUNITY
End: 2018-07-09 | Stop reason: SDUPTHER

## 2018-07-09 RX ORDER — LEVOTHYROXINE SODIUM 0.07 MG/1
1 TABLET ORAL DAILY
COMMUNITY
Start: 2016-03-04 | End: 2018-07-09 | Stop reason: SDUPTHER

## 2018-07-09 RX ORDER — ZOLPIDEM TARTRATE 10 MG/1
10 TABLET ORAL
Qty: 90 TABLET | Refills: 0 | Status: SHIPPED | OUTPATIENT
Start: 2018-07-09 | End: 2018-10-01 | Stop reason: SDUPTHER

## 2018-07-09 RX ORDER — LEVOTHYROXINE SODIUM 0.07 MG/1
75 TABLET ORAL DAILY
Qty: 90 TABLET | Refills: 3 | Status: SHIPPED | OUTPATIENT
Start: 2018-07-09 | End: 2019-10-16

## 2018-08-13 DIAGNOSIS — M79.7 FIBROMYALGIA: ICD-10-CM

## 2018-08-13 DIAGNOSIS — L30.9 ECZEMA, UNSPECIFIED TYPE: Primary | ICD-10-CM

## 2018-08-13 RX ORDER — TRIAMCINOLONE ACETONIDE 5 MG/G
CREAM TOPICAL AS NEEDED
Qty: 30 G | Refills: 0 | Status: SHIPPED | OUTPATIENT
Start: 2018-08-13

## 2018-08-13 RX ORDER — TRIAMCINOLONE ACETONIDE 5 MG/G
CREAM TOPICAL
COMMUNITY
Start: 2016-04-08 | End: 2018-08-13 | Stop reason: SDUPTHER

## 2018-08-13 RX ORDER — TRAMADOL HYDROCHLORIDE 50 MG/1
50 TABLET ORAL EVERY 6 HOURS PRN
Qty: 30 TABLET | Refills: 0 | Status: SHIPPED | OUTPATIENT
Start: 2018-08-13 | End: 2019-10-07

## 2018-09-20 DIAGNOSIS — I10 ESSENTIAL HYPERTENSION: ICD-10-CM

## 2018-09-20 DIAGNOSIS — M19.90 OSTEOARTHRITIS, UNSPECIFIED OSTEOARTHRITIS TYPE, UNSPECIFIED SITE: ICD-10-CM

## 2018-09-20 RX ORDER — LISINOPRIL 10 MG/1
10 TABLET ORAL DAILY
Qty: 90 TABLET | Refills: 3 | Status: SHIPPED | OUTPATIENT
Start: 2018-09-20 | End: 2019-07-22 | Stop reason: SDUPTHER

## 2018-09-20 RX ORDER — DICLOFENAC SODIUM 75 MG/1
TABLET, DELAYED RELEASE ORAL
Qty: 180 TABLET | Refills: 3 | Status: SHIPPED | OUTPATIENT
Start: 2018-09-20 | End: 2019-10-07

## 2018-10-01 DIAGNOSIS — G47.00 INSOMNIA, UNSPECIFIED TYPE: ICD-10-CM

## 2018-10-01 RX ORDER — ZOLPIDEM TARTRATE 10 MG/1
10 TABLET ORAL
Qty: 90 TABLET | Refills: 0 | OUTPATIENT
Start: 2018-10-01 | End: 2018-12-28 | Stop reason: SDUPTHER

## 2018-12-28 DIAGNOSIS — G47.00 INSOMNIA, UNSPECIFIED TYPE: ICD-10-CM

## 2018-12-28 RX ORDER — ZOLPIDEM TARTRATE 10 MG/1
10 TABLET ORAL
Qty: 90 TABLET | Refills: 0 | Status: SHIPPED | OUTPATIENT
Start: 2018-12-28 | End: 2019-03-28 | Stop reason: SDUPTHER

## 2019-03-28 DIAGNOSIS — G47.00 INSOMNIA, UNSPECIFIED TYPE: ICD-10-CM

## 2019-03-28 RX ORDER — ZOLPIDEM TARTRATE 10 MG/1
10 TABLET ORAL
Qty: 90 TABLET | Refills: 0 | Status: SHIPPED | OUTPATIENT
Start: 2019-03-28 | End: 2019-06-28 | Stop reason: SDUPTHER

## 2019-06-24 ENCOUNTER — TELEPHONE (OUTPATIENT)
Dept: FAMILY MEDICINE CLINIC | Facility: CLINIC | Age: 64
End: 2019-06-24

## 2019-06-28 DIAGNOSIS — G47.00 INSOMNIA, UNSPECIFIED TYPE: ICD-10-CM

## 2019-06-28 RX ORDER — ZOLPIDEM TARTRATE 10 MG/1
10 TABLET ORAL
Qty: 90 TABLET | Refills: 0 | Status: SHIPPED | OUTPATIENT
Start: 2019-06-28 | End: 2019-09-23 | Stop reason: SDUPTHER

## 2019-07-22 DIAGNOSIS — I10 ESSENTIAL HYPERTENSION: ICD-10-CM

## 2019-07-22 DIAGNOSIS — F32.A DEPRESSION, UNSPECIFIED DEPRESSION TYPE: ICD-10-CM

## 2019-07-22 RX ORDER — LISINOPRIL 10 MG/1
10 TABLET ORAL DAILY
Qty: 90 TABLET | Refills: 0 | Status: SHIPPED | OUTPATIENT
Start: 2019-07-22 | End: 2019-09-23 | Stop reason: SDUPTHER

## 2019-07-22 RX ORDER — VENLAFAXINE HYDROCHLORIDE 150 MG/1
150 CAPSULE, EXTENDED RELEASE ORAL DAILY
Qty: 90 CAPSULE | Refills: 0 | Status: SHIPPED | OUTPATIENT
Start: 2019-07-22 | End: 2019-09-23 | Stop reason: SDUPTHER

## 2019-07-22 NOTE — TELEPHONE ENCOUNTER
Prescriptions filled but no further refills until patient schedules follow-up appointment  She has not been seen here in almost 2 years

## 2019-07-29 ENCOUNTER — TELEPHONE (OUTPATIENT)
Dept: FAMILY MEDICINE CLINIC | Facility: CLINIC | Age: 64
End: 2019-07-29

## 2019-09-23 DIAGNOSIS — F32.A DEPRESSION, UNSPECIFIED DEPRESSION TYPE: ICD-10-CM

## 2019-09-23 DIAGNOSIS — I10 ESSENTIAL HYPERTENSION: ICD-10-CM

## 2019-09-23 DIAGNOSIS — G47.00 INSOMNIA, UNSPECIFIED TYPE: ICD-10-CM

## 2019-09-23 RX ORDER — ZOLPIDEM TARTRATE 10 MG/1
10 TABLET ORAL
Qty: 90 TABLET | Refills: 0 | Status: SHIPPED | OUTPATIENT
Start: 2019-09-23 | End: 2019-12-12 | Stop reason: SDUPTHER

## 2019-09-23 RX ORDER — VENLAFAXINE HYDROCHLORIDE 150 MG/1
150 CAPSULE, EXTENDED RELEASE ORAL DAILY
Qty: 90 CAPSULE | Refills: 0 | Status: SHIPPED | OUTPATIENT
Start: 2019-09-23 | End: 2019-12-16 | Stop reason: SDUPTHER

## 2019-09-23 RX ORDER — LISINOPRIL 10 MG/1
10 TABLET ORAL DAILY
Qty: 90 TABLET | Refills: 0 | Status: SHIPPED | OUTPATIENT
Start: 2019-09-23 | End: 2020-04-14 | Stop reason: SDUPTHER

## 2019-09-23 NOTE — TELEPHONE ENCOUNTER
Call patient and inform her prescriptions were filled but no further refills until patient schedules an appointment as she has not been seen here in over 2 years

## 2019-10-07 ENCOUNTER — OFFICE VISIT (OUTPATIENT)
Dept: FAMILY MEDICINE CLINIC | Facility: CLINIC | Age: 64
End: 2019-10-07
Payer: COMMERCIAL

## 2019-10-07 VITALS
SYSTOLIC BLOOD PRESSURE: 144 MMHG | HEIGHT: 67 IN | HEART RATE: 76 BPM | OXYGEN SATURATION: 92 % | TEMPERATURE: 97.8 F | WEIGHT: 191 LBS | RESPIRATION RATE: 18 BRPM | DIASTOLIC BLOOD PRESSURE: 84 MMHG | BODY MASS INDEX: 29.98 KG/M2

## 2019-10-07 DIAGNOSIS — J20.9 BRONCHITIS, ACUTE, WITH BRONCHOSPASM: Primary | ICD-10-CM

## 2019-10-07 PROCEDURE — 3008F BODY MASS INDEX DOCD: CPT | Performed by: FAMILY MEDICINE

## 2019-10-07 PROCEDURE — 99213 OFFICE O/P EST LOW 20 MIN: CPT | Performed by: FAMILY MEDICINE

## 2019-10-07 RX ORDER — ASPIRIN 81 MG
TABLET, DELAYED RELEASE (ENTERIC COATED) ORAL
COMMUNITY

## 2019-10-07 RX ORDER — METHYLPREDNISOLONE 4 MG/1
TABLET ORAL
Qty: 21 EACH | Refills: 0 | Status: SHIPPED | OUTPATIENT
Start: 2019-10-07 | End: 2019-10-16

## 2019-10-07 RX ORDER — ALBUTEROL SULFATE 90 UG/1
2 AEROSOL, METERED RESPIRATORY (INHALATION) EVERY 6 HOURS PRN
Qty: 1 INHALER | Refills: 0 | Status: SHIPPED | OUTPATIENT
Start: 2019-10-07 | End: 2020-04-14 | Stop reason: SDUPTHER

## 2019-10-07 RX ORDER — AZITHROMYCIN 250 MG/1
TABLET, FILM COATED ORAL
Qty: 6 TABLET | Refills: 0 | Status: SHIPPED | OUTPATIENT
Start: 2019-10-07 | End: 2019-10-11

## 2019-10-07 RX ORDER — TRAVOPROST 0.004 %
DROPS OPHTHALMIC (EYE)
COMMUNITY
Start: 2019-08-02 | End: 2022-02-07

## 2019-10-07 NOTE — LETTER
October 7, 2019     Patient: Maurilio Joshi   YOB: 1955   Date of Visit: 10/7/2019       To Whom it May Concern:    Maurilio Joshi is under my professional care  She was seen in my office on 10/7/2019  She may return to work on 10/10/2019  If you have any questions or concerns, please don't hesitate to call           Sincerely,          Huel Moritz, DO        CC: No Recipients

## 2019-10-07 NOTE — PROGRESS NOTES
Assessment/Plan:  Patient will be started on a Z-Oz and Medrol Dosepak  Patient given prescription for Proventil HFA inhaler 2 puffs q 6 hours p r n  Recommend Robitussin DM or Mucinex DM p r n , increase fluids and rest   Remain out of work today and tomorrow  Return to the office next week for follow-up appointment for chronic conditions and fasting labs as scheduled  Diagnoses and all orders for this visit:    Bronchitis, acute, with bronchospasm  -     azithromycin (ZITHROMAX) 250 mg tablet; Take 2 tablets today then 1 tablet daily x 4 days  -     methylPREDNISolone 4 MG tablet therapy pack; Use as directed on package  -     albuterol (PROVENTIL HFA,VENTOLIN HFA) 90 mcg/act inhaler; Inhale 2 puffs every 6 (six) hours as needed for wheezing or shortness of breath    Other orders  -     Docusate Sodium 100 MG capsule; Take by mouth  -     TRAVATAN Z 0 004 % ophthalmic solution          Subjective:      Patient ID: Olimpia Grider is a 58 y o  female  Patient complains of cold symptoms for the past 4 days  She complains of nasal congestion and cough productive of thick yellow mucus, chest tightness and wheezing  She has felt feverish with chills and sweats  She has treated this with Robitussin DM with some relief  Patient has discontinued several of her medications on her own  She has scheduled a follow-up appointment next week for chronic conditions and fasting labs  URI    This is a new problem  The current episode started in the past 7 days  The problem has been gradually worsening  Associated symptoms include congestion, coughing, headaches, a sore throat and wheezing  Pertinent negatives include no ear pain, plugged ear sensation, rhinorrhea, sinus pain or sneezing  She has tried NSAIDs and increased fluids (robitussin DM) for the symptoms  The treatment provided mild relief         The following portions of the patient's history were reviewed and updated as appropriate: allergies, current medications, past family history, past medical history, past social history, past surgical history and problem list     Review of Systems   HENT: Positive for congestion and sore throat  Negative for ear pain, rhinorrhea, sinus pain and sneezing  Respiratory: Positive for cough and wheezing  Neurological: Positive for headaches  Objective:      /84   Pulse 76   Temp 97 8 °F (36 6 °C) (Tympanic)   Resp 18   Ht 5' 7" (1 702 m)   Wt 86 6 kg (191 lb)   SpO2 92%   BMI 29 91 kg/m²          Physical Exam   Constitutional: She is oriented to person, place, and time  She appears well-developed and well-nourished  No distress  HENT:   Head: Normocephalic  Right Ear: External ear normal    Left Ear: External ear normal    Positive turbinates swelling with mucoid drainage  Throat postnasal drainage and injected  Mucous membranes moist    Eyes: Conjunctivae are normal  No scleral icterus  Neck: Neck supple  Cardiovascular: Normal rate and regular rhythm  Pulmonary/Chest: Effort normal  No respiratory distress  She has wheezes  Positive diffuse expiratory wheezes  Abdominal: Soft  There is no tenderness  Musculoskeletal: She exhibits no edema  Lymphadenopathy:     She has no cervical adenopathy  Neurological: She is alert and oriented to person, place, and time  Skin: Skin is warm and dry  Psychiatric: She has a normal mood and affect  BMI Counseling: Body mass index is 29 91 kg/m²  The BMI is above normal  Nutrition recommendations include decreasing overall calorie intake, reducing fast food intake, consuming healthier snacks, moderation in carbohydrate intake, reducing intake of saturated fat and trans fat and reducing intake of cholesterol  Exercise recommendations include moderate aerobic physical activity for 150 minutes/week

## 2019-10-09 ENCOUNTER — TELEPHONE (OUTPATIENT)
Dept: FAMILY MEDICINE CLINIC | Facility: CLINIC | Age: 64
End: 2019-10-09

## 2019-10-09 DIAGNOSIS — E78.2 MIXED HYPERLIPIDEMIA: ICD-10-CM

## 2019-10-09 DIAGNOSIS — I10 ESSENTIAL HYPERTENSION: ICD-10-CM

## 2019-10-09 DIAGNOSIS — E55.9 VITAMIN D DEFICIENCY: ICD-10-CM

## 2019-10-09 DIAGNOSIS — E03.9 HYPOTHYROIDISM, UNSPECIFIED TYPE: Primary | ICD-10-CM

## 2019-10-09 PROBLEM — M18.9 OSTEOARTHRITIS OF CARPOMETACARPAL (CMC) JOINT OF THUMB: Status: ACTIVE | Noted: 2017-03-22

## 2019-10-09 NOTE — TELEPHONE ENCOUNTER
Pt called - she has an appt on 10/15 at 5:00 pm  She is asking if you can put an order in for her BW so she can get it before her appt, as her appt is late and it will be fasting  She states once order is in she will go to a [facility] lab near her home to have done  She is requesting a call when it is in her chart, XN#[phone], she states we can leave a detailed message  Please advise  Thank you

## 2019-10-14 ENCOUNTER — APPOINTMENT (OUTPATIENT)
Dept: LAB | Facility: CLINIC | Age: 64
End: 2019-10-14
Payer: COMMERCIAL

## 2019-10-14 DIAGNOSIS — I10 ESSENTIAL HYPERTENSION: ICD-10-CM

## 2019-10-14 DIAGNOSIS — E78.2 MIXED HYPERLIPIDEMIA: ICD-10-CM

## 2019-10-14 DIAGNOSIS — E03.9 HYPOTHYROIDISM, UNSPECIFIED TYPE: ICD-10-CM

## 2019-10-14 DIAGNOSIS — E55.9 VITAMIN D DEFICIENCY: ICD-10-CM

## 2019-10-14 LAB
25(OH)D3 SERPL-MCNC: 14.6 NG/ML (ref 30–100)
ALBUMIN SERPL BCP-MCNC: 3.6 G/DL (ref 3.5–5)
ALP SERPL-CCNC: 83 U/L (ref 46–116)
ALT SERPL W P-5'-P-CCNC: 22 U/L (ref 12–78)
ANION GAP SERPL CALCULATED.3IONS-SCNC: 5 MMOL/L (ref 4–13)
AST SERPL W P-5'-P-CCNC: 12 U/L (ref 5–45)
BACTERIA UR QL AUTO: ABNORMAL /HPF
BILIRUB SERPL-MCNC: 0.24 MG/DL (ref 0.2–1)
BILIRUB UR QL STRIP: NEGATIVE
BUN SERPL-MCNC: 18 MG/DL (ref 5–25)
CALCIUM SERPL-MCNC: 9.1 MG/DL (ref 8.3–10.1)
CHLORIDE SERPL-SCNC: 103 MMOL/L (ref 100–108)
CHOLEST SERPL-MCNC: 319 MG/DL (ref 50–200)
CLARITY UR: CLEAR
CO2 SERPL-SCNC: 30 MMOL/L (ref 21–32)
COLOR UR: YELLOW
CREAT SERPL-MCNC: 0.74 MG/DL (ref 0.6–1.3)
ERYTHROCYTE [DISTWIDTH] IN BLOOD BY AUTOMATED COUNT: 12.3 % (ref 11.6–15.1)
GFR SERPL CREATININE-BSD FRML MDRD: 86 ML/MIN/1.73SQ M
GLUCOSE P FAST SERPL-MCNC: 88 MG/DL (ref 65–99)
GLUCOSE UR STRIP-MCNC: NEGATIVE MG/DL
HCT VFR BLD AUTO: 40.3 % (ref 34.8–46.1)
HDLC SERPL-MCNC: 36 MG/DL (ref 40–60)
HGB BLD-MCNC: 12.7 G/DL (ref 11.5–15.4)
HGB UR QL STRIP.AUTO: ABNORMAL
HYALINE CASTS #/AREA URNS LPF: ABNORMAL /LPF
KETONES UR STRIP-MCNC: NEGATIVE MG/DL
LEUKOCYTE ESTERASE UR QL STRIP: NEGATIVE
MCH RBC QN AUTO: 30.3 PG (ref 26.8–34.3)
MCHC RBC AUTO-ENTMCNC: 31.5 G/DL (ref 31.4–37.4)
MCV RBC AUTO: 96 FL (ref 82–98)
NITRITE UR QL STRIP: NEGATIVE
NON-SQ EPI CELLS URNS QL MICRO: ABNORMAL /HPF
NONHDLC SERPL-MCNC: 283 MG/DL
PH UR STRIP.AUTO: 6 [PH]
PLATELET # BLD AUTO: 312 THOUSANDS/UL (ref 149–390)
PMV BLD AUTO: 10 FL (ref 8.9–12.7)
POTASSIUM SERPL-SCNC: 4.4 MMOL/L (ref 3.5–5.3)
PROT SERPL-MCNC: 6.8 G/DL (ref 6.4–8.2)
PROT UR STRIP-MCNC: NEGATIVE MG/DL
RBC # BLD AUTO: 4.19 MILLION/UL (ref 3.81–5.12)
RBC #/AREA URNS AUTO: ABNORMAL /HPF
SODIUM SERPL-SCNC: 138 MMOL/L (ref 136–145)
SP GR UR STRIP.AUTO: 1.02 (ref 1–1.03)
TRIGL SERPL-MCNC: 763 MG/DL
TSH SERPL DL<=0.05 MIU/L-ACNC: 1.64 UIU/ML (ref 0.36–3.74)
UROBILINOGEN UR QL STRIP.AUTO: 0.2 E.U./DL
WBC # BLD AUTO: 7.74 THOUSAND/UL (ref 4.31–10.16)
WBC #/AREA URNS AUTO: ABNORMAL /HPF

## 2019-10-14 PROCEDURE — 80061 LIPID PANEL: CPT

## 2019-10-14 PROCEDURE — 81001 URINALYSIS AUTO W/SCOPE: CPT | Performed by: FAMILY MEDICINE

## 2019-10-14 PROCEDURE — 85027 COMPLETE CBC AUTOMATED: CPT

## 2019-10-14 PROCEDURE — 80053 COMPREHEN METABOLIC PANEL: CPT

## 2019-10-14 PROCEDURE — 84443 ASSAY THYROID STIM HORMONE: CPT

## 2019-10-14 PROCEDURE — 82306 VITAMIN D 25 HYDROXY: CPT

## 2019-10-14 PROCEDURE — 36415 COLL VENOUS BLD VENIPUNCTURE: CPT

## 2019-10-16 ENCOUNTER — OFFICE VISIT (OUTPATIENT)
Dept: FAMILY MEDICINE CLINIC | Facility: CLINIC | Age: 64
End: 2019-10-16
Payer: COMMERCIAL

## 2019-10-16 VITALS
HEIGHT: 67 IN | HEART RATE: 68 BPM | RESPIRATION RATE: 16 BRPM | BODY MASS INDEX: 30.45 KG/M2 | DIASTOLIC BLOOD PRESSURE: 72 MMHG | TEMPERATURE: 98.3 F | WEIGHT: 194 LBS | SYSTOLIC BLOOD PRESSURE: 140 MMHG

## 2019-10-16 DIAGNOSIS — F32.A DEPRESSION, UNSPECIFIED DEPRESSION TYPE: ICD-10-CM

## 2019-10-16 DIAGNOSIS — M79.7 FIBROMYALGIA: ICD-10-CM

## 2019-10-16 DIAGNOSIS — I10 ESSENTIAL HYPERTENSION: Primary | ICD-10-CM

## 2019-10-16 DIAGNOSIS — E55.9 VITAMIN D DEFICIENCY: ICD-10-CM

## 2019-10-16 DIAGNOSIS — Z12.39 SCREENING FOR BREAST CANCER: ICD-10-CM

## 2019-10-16 DIAGNOSIS — G47.00 INSOMNIA, UNSPECIFIED TYPE: ICD-10-CM

## 2019-10-16 DIAGNOSIS — E78.2 MIXED HYPERLIPIDEMIA: ICD-10-CM

## 2019-10-16 PROCEDURE — 99214 OFFICE O/P EST MOD 30 MIN: CPT | Performed by: FAMILY MEDICINE

## 2019-10-16 RX ORDER — ROSUVASTATIN CALCIUM 10 MG/1
10 TABLET, COATED ORAL DAILY
Qty: 30 TABLET | Refills: 5 | Status: SHIPPED | OUTPATIENT
Start: 2019-10-16 | End: 2020-01-27

## 2019-10-16 NOTE — PROGRESS NOTES
Assessment/Plan:  Patient declines flu vaccine  Patient agrees to schedule screening mammogram   Discussed treatment options with patient  Discussed cardiovascular risks of uncontrolled hyperlipidemia  Patient will try rosuvastatin 10 mg daily and continue other medications same  Patient instructed to follow a low-fat, low-salt and a low-carbohydrate diet and get regular aerobic exercise walking 150 minutes per week  Weight loss encouraged  Return to the office in 6 months for appointment and fasting labs  Diagnoses and all orders for this visit:    Essential hypertension    Mixed hyperlipidemia  -     rosuvastatin (CRESTOR) 10 MG tablet; Take 1 tablet (10 mg total) by mouth daily    Depression, unspecified depression type    Fibromyalgia    Insomnia, unspecified type    Vitamin D deficiency    Screening for breast cancer  -     Mammo screening bilateral w 3d & cad; Future          Subjective:      Patient ID: Dai Rg is a 58 y o  female  Patient is here for follow-up appointment for chronic conditions and we reviewed her recent fasting labs  Patient declines flu vaccine  Patient has been feeling significantly better overall  No regular exercise program and patient admits to not following her diet carefully  Patient admits to history of fibromyalgia and problem with statins in the past causing increased muscle aches although she states she did tolerate Crestor  Patient states he is up-to-date on colonoscopy  She agrees to schedule mammogram     Hypertension   This is a chronic problem  The problem is controlled  Associated symptoms include anxiety  Pertinent negatives include no blurred vision, chest pain, headaches, orthopnea, palpitations, peripheral edema, PND or shortness of breath  Risk factors for coronary artery disease include family history, dyslipidemia, obesity, post-menopausal state and smoking/tobacco exposure  Past treatments include ACE inhibitors   The current treatment provides significant improvement  Compliance problems include exercise and diet  There is no history of CAD/MI or CVA  Hyperlipidemia   This is a chronic problem  The problem is uncontrolled  Recent lipid tests were reviewed and are high  She has no history of diabetes  Associated symptoms include myalgias  Pertinent negatives include no chest pain, focal sensory loss, focal weakness, leg pain or shortness of breath  She is currently on no antihyperlipidemic treatment  The current treatment provides no improvement of lipids  Compliance problems include adherence to diet and adherence to exercise  Anxiety   Presents for follow-up visit  Symptoms include excessive worry, insomnia and nervous/anxious behavior  Patient reports no chest pain, decreased concentration, depressed mood, hyperventilation, irritability, palpitations, panic, restlessness, shortness of breath or suicidal ideas  The quality of sleep is good  Nighttime awakenings: occasional            The following portions of the patient's history were reviewed and updated as appropriate: allergies, current medications, past family history, past medical history, past social history, past surgical history and problem list     Review of Systems   Constitutional: Negative for irritability  Eyes: Negative for blurred vision  Respiratory: Negative for shortness of breath  Cardiovascular: Negative for chest pain, palpitations, orthopnea and PND  Musculoskeletal: Positive for myalgias  Neurological: Negative for focal weakness and headaches  Psychiatric/Behavioral: Negative for decreased concentration and suicidal ideas  The patient is nervous/anxious and has insomnia  Objective:      /72   Pulse 68   Temp 98 3 °F (36 8 °C) (Tympanic)   Resp 16   Ht 5' 7" (1 702 m)   Wt 88 kg (194 lb)   BMI 30 38 kg/m²          Physical Exam   Constitutional: She is oriented to person, place, and time   She appears well-developed and well-nourished  No distress  HENT:   Head: Normocephalic  Right Ear: External ear normal    Left Ear: External ear normal    Nose: Nose normal    Mouth/Throat: Oropharynx is clear and moist    Eyes: Conjunctivae are normal  No scleral icterus  Neck: Neck supple  No thyromegaly present  Cardiovascular: Normal rate and regular rhythm  Pulmonary/Chest: Effort normal and breath sounds normal  No respiratory distress  She has no wheezes  Abdominal: Soft  There is no tenderness  Musculoskeletal: She exhibits no edema  Lymphadenopathy:     She has no cervical adenopathy  Neurological: She is alert and oriented to person, place, and time  Skin: Skin is warm and dry  Psychiatric: She has a normal mood and affect

## 2019-10-18 ENCOUNTER — VBI (OUTPATIENT)
Dept: ADMINISTRATIVE | Facility: OTHER | Age: 64
End: 2019-10-18

## 2019-10-18 NOTE — TELEPHONE ENCOUNTER
10/18/19 11:53 AM     Upon review of the In Basket request and the patient's chart, initial outreach has been made to the medical records epartment at [facility] in [place] via fax to   A second outreach attempt will be made within 3 business days  Thank you  Gabby Munson     10/18/2019 11:49 AM Phone (Outgoing) [facility]     Call Complete - Dr Charleen Briseno is no longer with the hospital  She moved to [place]  Kwabena Koo suggested I contact the [address]  [phone]            10/18/2019 11:52 AM Phone (Outgoing) Jennifer Merged with [facility]     Call Complete - Asked that I fax a request for med  rec             11/05/2019 03:13 PM Phone ([employer] [facility] ([employer]) [phone] (M)    Left Message- Per office request  I left a message for Ani Jest requesting a call back to verify POS for colonoscopy  11/11/2019 10:56 AM Phone ([employer] [facility] ([employer]) [phone] (M)    Left Message- Per office request  I left a message for Ani Jest requesting a call back to verify POS for colonoscopy  11/15/2019 10:02 AM Phone ([employer] [facility] ([employer]) [phone] (H)    Missing or Invalid Number - Person picked up and stated that I have the wrong number        11/15/2019 10:06 AM Phone ([employer]) Fallonchris Myers ([employer]) [phone] (OTHER PHONE)    Missing or Invalid Number - automated message number you have reached is not in service  11/15/2019 10:07 AM Phone ([employer]) Fallonchris Myers ([employer]) [phone] (M)    Left Message- Per office request  I left a message for Ani Jest requesting a call back to verify POS for colonoscopy  11/15/19 10:12 AM     As a final attempt, a message was left for the patient  This In Basket will be closed and completed at this time   Should we receive the requested information because of previous outreach attempts, the requested patient's chart will be updated appropriately  *To review the status updates documented on this request, open sent messages within your In Basket and select this message to view comments  If comments do not automatically appear for review, select the properties button to review*    Thank you  Shaji Kuhn     11/15/2019 11:17 AM        Patient returned my call and stated that she may have had her colonoscopy performed at [facility] in [place], [place]         11/15/19 11:19 AM         Upon review of the In Basket request and the patient's chart, initial outreach has been made to the [facility] via telephone call spoke to medical records message line   Left a message for [facility]  A second outreach attempt will be made within 3 business days         Thank you    Shaji Kuhn      12/18/19 3:51 PM     As a follow-up, a second attempt has been made for outreach to the [facility]  via telephone call spoke to [facility], who stated that she was able to locate the document and will fax it to us as soon as she is able too  A third and final attempt will be made within 3 business days      Thank you  Shaji Kuhn

## 2019-10-18 NOTE — LETTER
[address], [address] [place] [address]           Mala Olmstead           Call [phone]           Fax [phone]           Fax [phone]    Colonoscopy Report Request Form      Patient's name:Padmini Bueno  EFF:[id number]    Referring Providers Name:  Regino Almendarez, DO    The above patient has informed us that they have completed a Colonoscopy, a Flex Sigmoidoscopy, a CT Colonography, a Cologuard test, or FIT/FOBT test with your office  Please complete this form, include any reports related to the above listed CRC screening tests with pathology reports (if applicable) that you may have and fax it to us at [phone] or [phone] so that we may update our medical records  These reports are needed for  compliance  Date of Colorectal Cancer Screening: _____________________    If patient has not had a Colonoscopy, a Flex Sigmoidoscopy, a CT Colonography, a Cologuard test, or FIT/FOBT test completed with your office, please indicate this below  Comments: ______________________________________________________________________  ______________________________________________________________________  ______________________________________________________________________  ______________________________________________________________________  ____________________________________________________________________________________________________________________________________________      Practice providing examination:      __________________________________________      Please fax this report to our office as soon as possible to [phone] or [phone]  We thank you for your assistance in treating our mutual patient

## 2019-12-12 DIAGNOSIS — G47.00 INSOMNIA, UNSPECIFIED TYPE: ICD-10-CM

## 2019-12-12 RX ORDER — ZOLPIDEM TARTRATE 10 MG/1
10 TABLET ORAL
Qty: 90 TABLET | Refills: 0 | Status: SHIPPED | OUTPATIENT
Start: 2019-12-12 | End: 2020-03-10 | Stop reason: SDUPTHER

## 2019-12-16 DIAGNOSIS — F32.A DEPRESSION, UNSPECIFIED DEPRESSION TYPE: ICD-10-CM

## 2019-12-16 RX ORDER — VENLAFAXINE HYDROCHLORIDE 150 MG/1
150 CAPSULE, EXTENDED RELEASE ORAL DAILY
Qty: 90 CAPSULE | Refills: 2 | Status: SHIPPED | OUTPATIENT
Start: 2019-12-16 | End: 2020-10-06 | Stop reason: SDUPTHER

## 2020-01-27 ENCOUNTER — OFFICE VISIT (OUTPATIENT)
Dept: FAMILY MEDICINE CLINIC | Facility: CLINIC | Age: 65
End: 2020-01-27
Payer: MEDICARE

## 2020-01-27 VITALS
TEMPERATURE: 98.5 F | OXYGEN SATURATION: 96 % | DIASTOLIC BLOOD PRESSURE: 92 MMHG | SYSTOLIC BLOOD PRESSURE: 158 MMHG | RESPIRATION RATE: 20 BRPM | HEART RATE: 72 BPM

## 2020-01-27 DIAGNOSIS — J98.01 COUGH DUE TO BRONCHOSPASM: ICD-10-CM

## 2020-01-27 DIAGNOSIS — J20.9 ACUTE BRONCHITIS, UNSPECIFIED ORGANISM: Primary | ICD-10-CM

## 2020-01-27 PROCEDURE — 94640 AIRWAY INHALATION TREATMENT: CPT | Performed by: FAMILY MEDICINE

## 2020-01-27 PROCEDURE — 99214 OFFICE O/P EST MOD 30 MIN: CPT | Performed by: FAMILY MEDICINE

## 2020-01-27 RX ORDER — BIMATOPROST 0.01 %
DROPS OPHTHALMIC (EYE)
COMMUNITY
Start: 2020-01-14

## 2020-01-27 RX ORDER — AMOXICILLIN 500 MG/1
1000 CAPSULE ORAL 3 TIMES DAILY
COMMUNITY
Start: 2020-01-24 | End: 2020-01-29

## 2020-01-27 RX ORDER — METHYLPREDNISOLONE 4 MG/1
TABLET ORAL
Qty: 21 EACH | Refills: 0 | Status: SHIPPED | OUTPATIENT
Start: 2020-01-27 | End: 2021-06-08 | Stop reason: ALTCHOICE

## 2020-01-27 RX ORDER — AZITHROMYCIN 250 MG/1
TABLET, FILM COATED ORAL
Qty: 6 TABLET | Refills: 0 | Status: SHIPPED | OUTPATIENT
Start: 2020-01-27 | End: 2020-01-31

## 2020-01-27 NOTE — PROGRESS NOTES
Assessment/Plan:  Patient is significantly clinically improved after nebulizer treatment  Respirations are unlabored  Patient was started on a Z-Oz and Medrol Dosepak and instructed to discontinue amoxicillin  She may continue Proventil HFA inhaler, Tessalon p r n  And Mucinex  Recommend increase fluids and rest and remain out of work the next 3 days  Return to the office in 1 week or call sooner p r n  Or report to the emergency room for worsening symptoms  Discussed with patient and   Diagnoses and all orders for this visit:    Acute bronchitis, unspecified organism  -     Mini neb  -     azithromycin (ZITHROMAX) 250 mg tablet; Take 2 tablets today then 1 tablet daily x 4 days  -     methylPREDNISolone 4 MG tablet therapy pack; Use as directed on package    Cough due to bronchospasm    Other orders  -     amoxicillin (AMOXIL) 500 mg capsule; Take 1,000 mg by mouth Three times a day  -     LUMIGAN 0 01 % ophthalmic drops; INSTILL 1 DROP IN THE LEFT EYE HS          Subjective:      Patient ID: Jennifer Peralta is a 71 y o  female  Patient is being seen in follow-up from an urgent care visit NEENA Monae on 01/24/2020 diagnosed with lower respiratory tract infection  Patient had chest x-ray which was negative for pneumonia  She was treated with a nebulizer treatment there with some improvement and discharged home on amoxicillin 1000 mg t i d  For 5 days, albuterol inhaler and Tessalon Perles  Patient states she developed cold symptoms almost 2 weeks ago  Patient had been feeling somewhat better overall until last night complained of a fever to 102, chest tightness, wheezing, shortness of breath and cough productive of yellow mucus  No significant relief with albuterol inhaler  She complains of shortness of breath with exertion  Patient states she did not get yearly flu vaccine this season and has not had pneumonia vaccines    Patient quit smoking 10 years ago and previously smoked 1 pack of cigarettes per day  She denies any recent travel  Cough   This is a new problem  The current episode started 1 to 4 weeks ago  The problem has been gradually worsening  The cough is productive of purulent sputum  Associated symptoms include chills, a fever, headaches, myalgias, shortness of breath, sweats and wheezing  Pertinent negatives include no chest pain, hemoptysis, nasal congestion, postnasal drip, rash or sore throat  She has tried a beta-agonist inhaler and prescription cough suppressant (AMOXICILLIN) for the symptoms  The treatment provided no relief  The following portions of the patient's history were reviewed and updated as appropriate: allergies, current medications, past family history, past medical history, past social history, past surgical history and problem list     Review of Systems   Constitutional: Positive for chills and fever  HENT: Negative for postnasal drip and sore throat  Respiratory: Positive for cough, shortness of breath and wheezing  Negative for hemoptysis  Cardiovascular: Negative for chest pain  Musculoskeletal: Positive for myalgias  Skin: Negative for rash  Neurological: Positive for headaches  Objective:      /92 (BP Location: Left arm, Patient Position: Sitting, Cuff Size: Standard)   Pulse 76   Temp 98 5 °F (36 9 °C) (Tympanic)   Resp (!) 28   SpO2 96%     Mini neb  Performed by: Kevin Dejesus DO  Authorized by: Kevin Dejesus DO     Number of treatments:  1  Treatment 1:   Pre-Procedure     Symptoms:  Wheezing, shortness of breath and cough    Medication Administered:  Albuterol 2 5 mg  Post-Procedure     Lung sounds:  Improved significantly less wheezing  Physical Exam   Constitutional: She is oriented to person, place, and time  She appears well-developed and well-nourished  HENT:   Head: Normocephalic     Right Ear: External ear normal    Left Ear: External ear normal    Nose: Nose normal    Mouth/Throat: Oropharynx is clear and moist    Eyes: Conjunctivae are normal  No scleral icterus  Neck: Neck supple  Cardiovascular: Normal rate and regular rhythm  Pulmonary/Chest: No respiratory distress  She has wheezes  Scattered inspiratory and expiratory wheezes  Abdominal: Soft  There is no tenderness  Musculoskeletal: She exhibits no edema  Lymphadenopathy:     She has no cervical adenopathy  Neurological: She is alert and oriented to person, place, and time  Skin: Skin is warm and dry  Psychiatric: She has a normal mood and affect

## 2020-03-10 DIAGNOSIS — G47.00 INSOMNIA, UNSPECIFIED TYPE: ICD-10-CM

## 2020-03-10 RX ORDER — ZOLPIDEM TARTRATE 10 MG/1
10 TABLET ORAL
Qty: 90 TABLET | Refills: 0 | Status: SHIPPED | OUTPATIENT
Start: 2020-03-10 | End: 2020-06-08 | Stop reason: SDUPTHER

## 2020-04-14 DIAGNOSIS — I10 ESSENTIAL HYPERTENSION: ICD-10-CM

## 2020-04-14 DIAGNOSIS — J20.9 BRONCHITIS, ACUTE, WITH BRONCHOSPASM: ICD-10-CM

## 2020-04-14 RX ORDER — LISINOPRIL 10 MG/1
10 TABLET ORAL DAILY
Qty: 90 TABLET | Refills: 0 | Status: SHIPPED | OUTPATIENT
Start: 2020-04-14 | End: 2020-07-14 | Stop reason: SDUPTHER

## 2020-04-14 RX ORDER — ALBUTEROL SULFATE 90 UG/1
2 AEROSOL, METERED RESPIRATORY (INHALATION) EVERY 6 HOURS PRN
Qty: 3 INHALER | Refills: 0 | Status: SHIPPED | OUTPATIENT
Start: 2020-04-14 | End: 2020-12-17

## 2020-05-27 ENCOUNTER — TELEMEDICINE (OUTPATIENT)
Dept: FAMILY MEDICINE CLINIC | Facility: CLINIC | Age: 65
End: 2020-05-27
Payer: MEDICARE

## 2020-05-27 DIAGNOSIS — Z20.822 EXPOSURE TO COVID-19 VIRUS: Primary | ICD-10-CM

## 2020-05-27 DIAGNOSIS — Z20.828 EXPOSURE TO SARS-ASSOCIATED CORONAVIRUS: ICD-10-CM

## 2020-05-27 PROCEDURE — 99442 PR PHYS/QHP TELEPHONE EVALUATION 11-20 MIN: CPT | Performed by: FAMILY MEDICINE

## 2020-05-28 DIAGNOSIS — Z20.828 EXPOSURE TO SARS-ASSOCIATED CORONAVIRUS: ICD-10-CM

## 2020-05-28 PROCEDURE — U0003 INFECTIOUS AGENT DETECTION BY NUCLEIC ACID (DNA OR RNA); SEVERE ACUTE RESPIRATORY SYNDROME CORONAVIRUS 2 (SARS-COV-2) (CORONAVIRUS DISEASE [COVID-19]), AMPLIFIED PROBE TECHNIQUE, MAKING USE OF HIGH THROUGHPUT TECHNOLOGIES AS DESCRIBED BY CMS-2020-01-R: HCPCS

## 2020-05-30 LAB — SARS-COV-2 RNA SPEC QL NAA+PROBE: NOT DETECTED

## 2020-06-01 ENCOUNTER — TELEPHONE (OUTPATIENT)
Dept: OTHER | Facility: OTHER | Age: 65
End: 2020-06-01

## 2020-06-01 ENCOUNTER — TELEPHONE (OUTPATIENT)
Dept: FAMILY MEDICINE CLINIC | Facility: CLINIC | Age: 65
End: 2020-06-01

## 2020-06-01 DIAGNOSIS — F41.9 ANXIETY: Primary | ICD-10-CM

## 2020-06-01 RX ORDER — LORAZEPAM 0.5 MG/1
0.5 TABLET ORAL EVERY 8 HOURS PRN
Qty: 30 TABLET | Refills: 0 | Status: SHIPPED | OUTPATIENT
Start: 2020-06-01 | End: 2020-06-17 | Stop reason: SDUPTHER

## 2020-06-08 DIAGNOSIS — G47.00 INSOMNIA, UNSPECIFIED TYPE: ICD-10-CM

## 2020-06-08 RX ORDER — ZOLPIDEM TARTRATE 10 MG/1
10 TABLET ORAL
Qty: 90 TABLET | Refills: 0 | Status: SHIPPED | OUTPATIENT
Start: 2020-06-08 | End: 2020-09-04 | Stop reason: SDUPTHER

## 2020-06-17 DIAGNOSIS — F41.9 ANXIETY: ICD-10-CM

## 2020-06-17 RX ORDER — LORAZEPAM 0.5 MG/1
0.5 TABLET ORAL EVERY 8 HOURS PRN
Qty: 30 TABLET | Refills: 0 | Status: SHIPPED | OUTPATIENT
Start: 2020-06-17 | End: 2020-07-14 | Stop reason: SDUPTHER

## 2020-07-14 DIAGNOSIS — F41.9 ANXIETY: ICD-10-CM

## 2020-07-14 DIAGNOSIS — I10 ESSENTIAL HYPERTENSION: ICD-10-CM

## 2020-07-14 RX ORDER — LORAZEPAM 0.5 MG/1
0.5 TABLET ORAL EVERY 8 HOURS PRN
Qty: 90 TABLET | Refills: 0 | Status: SHIPPED | OUTPATIENT
Start: 2020-07-14 | End: 2020-10-05 | Stop reason: SDUPTHER

## 2020-07-14 RX ORDER — LISINOPRIL 10 MG/1
10 TABLET ORAL DAILY
Qty: 90 TABLET | Refills: 0 | Status: SHIPPED | OUTPATIENT
Start: 2020-07-14 | End: 2020-10-13

## 2020-07-27 ENCOUNTER — TELEPHONE (OUTPATIENT)
Dept: FAMILY MEDICINE CLINIC | Facility: CLINIC | Age: 65
End: 2020-07-27

## 2020-07-27 NOTE — TELEPHONE ENCOUNTER
Marga Orellana lost her  last month and wanted to ask you for suggestions to a grief counselor  She also stated she is getting migraines again and she thinks this is related to her stress of losing her   Could you please prescribe her medication for this      [facility] on [address] and [address]

## 2020-07-29 ENCOUNTER — TELEMEDICINE (OUTPATIENT)
Dept: FAMILY MEDICINE CLINIC | Facility: CLINIC | Age: 65
End: 2020-07-29
Payer: MEDICARE

## 2020-07-29 DIAGNOSIS — R51.9 ACUTE NONINTRACTABLE HEADACHE, UNSPECIFIED HEADACHE TYPE: ICD-10-CM

## 2020-07-29 DIAGNOSIS — F41.9 ANXIETY: Primary | ICD-10-CM

## 2020-07-29 PROCEDURE — 3080F DIAST BP >= 90 MM HG: CPT | Performed by: FAMILY MEDICINE

## 2020-07-29 PROCEDURE — 3077F SYST BP >= 140 MM HG: CPT | Performed by: FAMILY MEDICINE

## 2020-07-29 PROCEDURE — 99213 OFFICE O/P EST LOW 20 MIN: CPT | Performed by: FAMILY MEDICINE

## 2020-07-29 PROCEDURE — 1036F TOBACCO NON-USER: CPT | Performed by: FAMILY MEDICINE

## 2020-07-29 RX ORDER — BUTALBITAL, ACETAMINOPHEN AND CAFFEINE 50; 325; 40 MG/1; MG/1; MG/1
1 TABLET ORAL EVERY 4 HOURS PRN
Qty: 30 TABLET | Refills: 0 | Status: SHIPPED | OUTPATIENT
Start: 2020-07-29 | End: 2020-07-29 | Stop reason: SDUPTHER

## 2020-07-29 RX ORDER — BUTALBITAL, ACETAMINOPHEN AND CAFFEINE 50; 325; 40 MG/1; MG/1; MG/1
1 TABLET ORAL EVERY 4 HOURS PRN
Qty: 30 TABLET | Refills: 0 | Status: SHIPPED | OUTPATIENT
Start: 2020-07-29 | End: 2022-02-07 | Stop reason: SDUPTHER

## 2020-07-29 NOTE — PROGRESS NOTES
Virtual Regular Visit      Assessment/Plan:  Discussed treatment options with patient  Patient is being referred to psychologist for counseling and therapy  Patient will looking to groups for grief counseling  Patient was started on Fioricet 1 q 4 hours p r n , caution regarding drowsiness  Return to the office in 2-3 weeks or call sooner p r n     Problem List Items Addressed This Visit        Other    Anxiety - Primary    Relevant Orders    Ambulatory referral to Psychology      Other Visit Diagnoses     Acute nonintractable headache, unspecified headache type        Relevant Medications    butalbital-acetaminophen-caffeine (Esgic) -40 mg per tablet               Reason for visit is No chief complaint on file  Encounter provider Kelsy Lindquist DO    Provider located at [address] [place] [address] [id number]      Recent Visits  Date Type Provider Dept   07/27/20 Telephone Oz Crouch, [facility] E  [facility]  recent visits within past 7 days and meeting all other requirements     Future Appointments  No visits were found meeting these conditions  Showing future appointments within next 150 days and meeting all other requirements        The patient was identified by name and date of birth  Coby Alvarado was informed that this is a telemedicine visit and that the visit is being conducted through [employer] Study Course and patient was informed that this is not a secure, HIPAA-complaint platform  She agrees to proceed     My office door was closed  No one else was in the room  She acknowledged consent and understanding of privacy and security of the video platform  The patient has agreed to participate and understands they can discontinue the visit at any time  Patient is aware this is a billable service  Subjective  Coby Alvarado is a 71 y o  female complains of recent headaches 3 times in the past 2 weeks    Headaches are associated with wavy lines  She denies nausea or vomiting and denies photophobia or noise intolerance  Patient states she has a history of migraines started in her 25s  Patient also admits to feeling anxious and stressed and grieving and sadness regarding her  dying 6 weeks ago secondary to [place]margarita while hospitalized  She gets some relief with lorazepam p r n  Mack Olvera Appetite is fairly good although patient is still not sleeping well  Discussed referral to psychologist for counseling and will look into groups for grieving  Anxiety   Presents for follow-up visit  Symptoms include decreased concentration, depressed mood, excessive worry, insomnia, muscle tension, nervous/anxious behavior and restlessness  Patient reports no chest pain, confusion, dizziness, hyperventilation, irritability, palpitations, panic, shortness of breath or suicidal ideas  The severity of symptoms is interfering with daily activities and causing significant distress  The quality of sleep is poor   Nighttime awakenings: occasional             Past Medical History:   Diagnosis Date    Depression     Last assessed 5/26/2017     Hypertension     Last assessed 1/20/2016     Thyroid disease        Past Surgical History:   Procedure Laterality Date    BACK SURGERY      Spine repair     COLONOSCOPY  2014    NERVE BLOCK Left     Peripheral nerve block, wrist, median        Current Outpatient Medications   Medication Sig Dispense Refill    albuterol (PROVENTIL HFA,VENTOLIN HFA) 90 mcg/act inhaler Inhale 2 puffs every 6 (six) hours as needed for wheezing or shortness of breath 3 Inhaler 0    butalbital-acetaminophen-caffeine (Esgic) -40 mg per tablet Take 1 tablet by mouth every 4 (four) hours as needed for headaches 30 tablet 0    Docusate Sodium 100 MG capsule Take by mouth      lisinopril (ZESTRIL) 10 mg tablet Take 1 tablet (10 mg total) by mouth daily 90 tablet 0    LORazepam (ATIVAN) 0 5 mg tablet Take 1 tablet (0 5 mg total) by mouth every 8 (eight) hours as needed for anxiety 90 tablet 0    LUMIGAN 0 01 % ophthalmic drops INSTILL 1 DROP IN THE LEFT EYE HS      methylPREDNISolone 4 MG tablet therapy pack Use as directed on package 21 each 0    TRAVATAN Z 0 004 % ophthalmic solution       triamcinolone (KENALOG) 0 5 % cream Apply topically as needed (eczema) 30 g 0    venlafaxine (EFFEXOR-XR) 150 mg 24 hr capsule Take 1 capsule (150 mg total) by mouth daily 90 capsule 2    zolpidem (AMBIEN) 10 mg tablet Take 1 tablet (10 mg total) by mouth daily at bedtime as needed for sleep 90 tablet 0     No current facility-administered medications for this visit  No Known Allergies    Review of Systems   Constitutional: Negative for irritability  Respiratory: Negative for shortness of breath  Cardiovascular: Negative for chest pain and palpitations  Neurological: Positive for headaches  Negative for dizziness, syncope, weakness and light-headedness  Psychiatric/Behavioral: Positive for decreased concentration  Negative for confusion and suicidal ideas  The patient is nervous/anxious and has insomnia  Video Exam    There were no vitals filed for this visit  Physical Exam   Constitutional: She is oriented to person, place, and time  She appears well-developed and well-nourished  No distress  HENT:   Head: Normocephalic  Eyes: Conjunctivae are normal  No scleral icterus  Pulmonary/Chest: Effort normal  No respiratory distress  Neurological: She is alert and oriented to person, place, and time  Skin: She is not diaphoretic  Psychiatric:   Anxious and somewhat tearful        I spent 20 minutes directly with the patient during this visit      VIRTUAL VISIT DISCLAIMER    Skeeter Castleman acknowledges that she has consented to an online visit or consultation   She understands that the online visit is based solely on information provided by her, and that, in the absence of a face-to-face physical evaluation by the physician, the diagnosis she receives is both limited and provisional in terms of accuracy and completeness  This is not intended to replace a full medical face-to-face evaluation by the physician  Michael Landeros understands and accepts these terms

## 2020-08-14 ENCOUNTER — TELEPHONE (OUTPATIENT)
Dept: FAMILY MEDICINE CLINIC | Facility: CLINIC | Age: 65
End: 2020-08-14

## 2020-08-14 NOTE — TELEPHONE ENCOUNTER
Patient is calling asking if you can provide her a note to remain out of work until it's deemed "safe" to go back due to COVID-19  She states at her last office visit she discussed this with you and you agreed that she should remain out due to some of her medical conditions that would make her high-risk as a   She stated her employer would hold her job position if a letter can be provided  She stated she has asthma and fibromyalgia but also experiences anxiety since she [facility]  [facility]   Please advise

## 2020-08-14 NOTE — LETTER
2020    Re: Rashid ELLIS 1955       To Whom It May Concern,    I would recommend that patient remain out of work as a  during the current Coronavirus pandemic as she is at high risk secondary to her medical conditions                Sincerely,          Nel Frost DO

## 2020-09-04 DIAGNOSIS — G47.00 INSOMNIA, UNSPECIFIED TYPE: ICD-10-CM

## 2020-09-04 RX ORDER — ZOLPIDEM TARTRATE 10 MG/1
10 TABLET ORAL
Qty: 90 TABLET | Refills: 0 | Status: SHIPPED | OUTPATIENT
Start: 2020-09-04 | End: 2020-11-27 | Stop reason: SDUPTHER

## 2020-10-05 DIAGNOSIS — F41.9 ANXIETY: ICD-10-CM

## 2020-10-05 RX ORDER — LORAZEPAM 0.5 MG/1
0.5 TABLET ORAL EVERY 8 HOURS PRN
Qty: 90 TABLET | Refills: 0 | Status: SHIPPED | OUTPATIENT
Start: 2020-10-05 | End: 2020-10-06 | Stop reason: SDUPTHER

## 2020-10-06 DIAGNOSIS — F32.A DEPRESSION, UNSPECIFIED DEPRESSION TYPE: ICD-10-CM

## 2020-10-06 DIAGNOSIS — F41.9 ANXIETY: ICD-10-CM

## 2020-10-06 RX ORDER — VENLAFAXINE HYDROCHLORIDE 150 MG/1
150 CAPSULE, EXTENDED RELEASE ORAL DAILY
Qty: 90 CAPSULE | Refills: 2 | Status: SHIPPED | OUTPATIENT
Start: 2020-10-06 | End: 2021-07-07

## 2020-10-06 RX ORDER — LORAZEPAM 0.5 MG/1
0.5 TABLET ORAL EVERY 8 HOURS PRN
Qty: 90 TABLET | Refills: 0 | Status: SHIPPED | OUTPATIENT
Start: 2020-10-06

## 2020-10-06 RX ORDER — LORAZEPAM 0.5 MG/1
0.5 TABLET ORAL EVERY 8 HOURS PRN
Qty: 90 TABLET | Refills: 0 | OUTPATIENT
Start: 2020-10-06

## 2020-10-06 RX ORDER — VENLAFAXINE HYDROCHLORIDE 150 MG/1
150 CAPSULE, EXTENDED RELEASE ORAL DAILY
Qty: 90 CAPSULE | Refills: 2 | Status: SHIPPED | OUTPATIENT
Start: 2020-10-06 | End: 2020-10-06 | Stop reason: SDUPTHER

## 2020-10-13 DIAGNOSIS — I10 ESSENTIAL HYPERTENSION: ICD-10-CM

## 2020-10-13 RX ORDER — LISINOPRIL 10 MG/1
TABLET ORAL
Qty: 90 TABLET | Refills: 0 | Status: SHIPPED | OUTPATIENT
Start: 2020-10-13 | End: 2021-05-26 | Stop reason: SDUPTHER

## 2020-11-25 ENCOUNTER — TELEPHONE (OUTPATIENT)
Dept: FAMILY MEDICINE CLINIC | Facility: CLINIC | Age: 65
End: 2020-11-25

## 2020-11-27 DIAGNOSIS — G47.00 INSOMNIA, UNSPECIFIED TYPE: ICD-10-CM

## 2020-11-27 RX ORDER — ZOLPIDEM TARTRATE 10 MG/1
10 TABLET ORAL
Qty: 90 TABLET | Refills: 0 | Status: SHIPPED | OUTPATIENT
Start: 2020-11-27 | End: 2021-02-23 | Stop reason: SDUPTHER

## 2020-12-17 DIAGNOSIS — J20.9 BRONCHITIS, ACUTE, WITH BRONCHOSPASM: ICD-10-CM

## 2020-12-17 RX ORDER — ALBUTEROL SULFATE 90 UG/1
AEROSOL, METERED RESPIRATORY (INHALATION)
Qty: 25.5 G | Refills: 1 | Status: SHIPPED | OUTPATIENT
Start: 2020-12-17 | End: 2021-05-24

## 2021-02-23 DIAGNOSIS — G47.00 INSOMNIA, UNSPECIFIED TYPE: ICD-10-CM

## 2021-02-23 RX ORDER — ZOLPIDEM TARTRATE 10 MG/1
10 TABLET ORAL
Qty: 90 TABLET | Refills: 0 | Status: SHIPPED | OUTPATIENT
Start: 2021-02-23 | End: 2021-05-26 | Stop reason: SDUPTHER

## 2021-02-25 DIAGNOSIS — G47.00 INSOMNIA, UNSPECIFIED TYPE: ICD-10-CM

## 2021-02-25 RX ORDER — ZOLPIDEM TARTRATE 10 MG/1
TABLET ORAL
Qty: 90 TABLET | OUTPATIENT
Start: 2021-02-25

## 2021-05-24 DIAGNOSIS — J20.9 BRONCHITIS, ACUTE, WITH BRONCHOSPASM: ICD-10-CM

## 2021-05-24 RX ORDER — ALBUTEROL SULFATE 90 UG/1
AEROSOL, METERED RESPIRATORY (INHALATION)
Qty: 25.5 G | Refills: 1 | Status: SHIPPED | OUTPATIENT
Start: 2021-05-24 | End: 2022-02-07 | Stop reason: SDUPTHER

## 2021-05-26 DIAGNOSIS — G47.00 INSOMNIA, UNSPECIFIED TYPE: ICD-10-CM

## 2021-05-26 DIAGNOSIS — I10 ESSENTIAL HYPERTENSION: ICD-10-CM

## 2021-05-26 RX ORDER — ZOLPIDEM TARTRATE 10 MG/1
10 TABLET ORAL
Qty: 90 TABLET | Refills: 0 | Status: SHIPPED | OUTPATIENT
Start: 2021-05-26 | End: 2021-08-27 | Stop reason: SDUPTHER

## 2021-05-26 RX ORDER — LISINOPRIL 10 MG/1
10 TABLET ORAL DAILY
Qty: 90 TABLET | Refills: 0 | Status: SHIPPED | OUTPATIENT
Start: 2021-05-26 | End: 2021-06-04

## 2021-06-04 DIAGNOSIS — I10 ESSENTIAL HYPERTENSION: ICD-10-CM

## 2021-06-04 RX ORDER — LISINOPRIL 10 MG/1
TABLET ORAL
Qty: 90 TABLET | Refills: 0 | Status: SHIPPED | OUTPATIENT
Start: 2021-06-04 | End: 2021-06-08 | Stop reason: SDUPTHER

## 2021-06-08 ENCOUNTER — OFFICE VISIT (OUTPATIENT)
Dept: FAMILY MEDICINE CLINIC | Facility: CLINIC | Age: 66
End: 2021-06-08
Payer: COMMERCIAL

## 2021-06-08 VITALS
OXYGEN SATURATION: 95 % | DIASTOLIC BLOOD PRESSURE: 90 MMHG | SYSTOLIC BLOOD PRESSURE: 152 MMHG | BODY MASS INDEX: 30.28 KG/M2 | HEIGHT: 66 IN | RESPIRATION RATE: 16 BRPM | HEART RATE: 76 BPM | WEIGHT: 188.4 LBS | TEMPERATURE: 97.2 F

## 2021-06-08 DIAGNOSIS — E78.2 MIXED HYPERLIPIDEMIA: ICD-10-CM

## 2021-06-08 DIAGNOSIS — Z00.00 MEDICARE ANNUAL WELLNESS VISIT, INITIAL: ICD-10-CM

## 2021-06-08 DIAGNOSIS — G47.00 INSOMNIA, UNSPECIFIED TYPE: ICD-10-CM

## 2021-06-08 DIAGNOSIS — E55.9 VITAMIN D DEFICIENCY: ICD-10-CM

## 2021-06-08 DIAGNOSIS — F41.9 ANXIETY: ICD-10-CM

## 2021-06-08 DIAGNOSIS — Z12.11 COLON CANCER SCREENING: ICD-10-CM

## 2021-06-08 DIAGNOSIS — I10 ESSENTIAL HYPERTENSION: Primary | ICD-10-CM

## 2021-06-08 DIAGNOSIS — Z12.31 VISIT FOR SCREENING MAMMOGRAM: ICD-10-CM

## 2021-06-08 DIAGNOSIS — F32.A DEPRESSION, UNSPECIFIED DEPRESSION TYPE: ICD-10-CM

## 2021-06-08 PROBLEM — M54.50 LOW BACK PAIN: Status: ACTIVE | Noted: 2017-03-22

## 2021-06-08 PROCEDURE — 3288F FALL RISK ASSESSMENT DOCD: CPT | Performed by: FAMILY MEDICINE

## 2021-06-08 PROCEDURE — 1123F ACP DISCUSS/DSCN MKR DOCD: CPT | Performed by: FAMILY MEDICINE

## 2021-06-08 PROCEDURE — 36415 COLL VENOUS BLD VENIPUNCTURE: CPT | Performed by: FAMILY MEDICINE

## 2021-06-08 PROCEDURE — 1036F TOBACCO NON-USER: CPT | Performed by: FAMILY MEDICINE

## 2021-06-08 PROCEDURE — G0438 PPPS, INITIAL VISIT: HCPCS | Performed by: FAMILY MEDICINE

## 2021-06-08 PROCEDURE — 99214 OFFICE O/P EST MOD 30 MIN: CPT | Performed by: FAMILY MEDICINE

## 2021-06-08 RX ORDER — LISINOPRIL 20 MG/1
20 TABLET ORAL DAILY
Qty: 90 TABLET | Refills: 2 | Status: SHIPPED | OUTPATIENT
Start: 2021-06-08 | End: 2021-11-26 | Stop reason: SDUPTHER

## 2021-06-08 RX ORDER — EZETIMIBE 10 MG/1
10 TABLET ORAL DAILY
Qty: 90 TABLET | Refills: 2 | Status: SHIPPED | OUTPATIENT
Start: 2021-06-08 | End: 2022-02-07

## 2021-06-08 NOTE — PATIENT INSTRUCTIONS
Medicare Preventive Visit Patient Instructions  Thank you for completing your Welcome to Medicare Visit or Medicare Annual Wellness Visit today  Your next wellness visit will be due in one year (6/9/2022)  The screening/preventive services that you may require over the next 5-10 years are detailed below  Some tests may not apply to you based off risk factors and/or age  Screening tests ordered at today's visit but not completed yet may show as past due  Also, please note that scanned in results may not display below  Preventive Screenings:  Service Recommendations Previous Testing/Comments   Colorectal Cancer Screening  * Colonoscopy    * Fecal Occult Blood Test (FOBT)/Fecal Immunochemical Test (FIT)  * Fecal DNA/Cologuard Test  * Flexible Sigmoidoscopy Age: 54-65 years old   Colonoscopy: every 10 years (may be performed more frequently if at higher risk)  OR  FOBT/FIT: every 1 year  OR  Cologuard: every 3 years  OR  Sigmoidoscopy: every 5 years  Screening may be recommended earlier than age 48 if at higher risk for colorectal cancer  Also, an individualized decision between you and your healthcare provider will decide whether screening between the ages of 74-80 would be appropriate  Colonoscopy: 01/13/2011  FOBT/FIT: Not on file  Cologuard: Not on file  Sigmoidoscopy: Not on file          Breast Cancer Screening Age: 36 years old  Frequency: every 1-2 years  Not required if history of left and right mastectomy Mammogram: Not on file        Cervical Cancer Screening Between the ages of 21-29, pap smear recommended once every 3 years  Between the ages of 33-67, can perform pap smear with HPV co-testing every 5 years     Recommendations may differ for women with a history of total hysterectomy, cervical cancer, or abnormal pap smears in past  Pap Smear: Not on file    Screening Not Indicated   Hepatitis C Screening Once for adults born between [facility]  More frequently in patients at high risk for Hepatitis C Hep C Antibody: Not on file        Diabetes Screening 1-2 times per year if you're at risk for diabetes or have pre-diabetes Fasting glucose: 88 mg/dL   A1C: No results in last 5 years        Cholesterol Screening Once every 5 years if you don't have a lipid disorder  May order more often based on risk factors  Lipid panel: 10/14/2019    Screening Not Indicated  History Lipid Disorder     Other Preventive Screenings Covered by Medicare:  1  Abdominal Aortic Aneurysm (AAA) Screening: covered once if your at risk  You're considered to be at risk if you have a family history of AAA  2  Lung Cancer Screening: covers low dose CT scan once per year if you meet all of the following conditions: (1) Age 50-69; (2) No signs or symptoms of lung cancer; (3) Current smoker or have quit smoking within the last 15 years; (4) You have a tobacco smoking history of at least 30 pack years (packs per day multiplied by number of years you smoked); (5) You get a written order from a healthcare provider  3  Glaucoma Screening: covered annually if you're considered high risk: (1) You have diabetes OR (2) Family history of glaucoma OR (3)  aged 48 and older OR (3)  American aged 72 and older  3  Osteoporosis Screening: covered every 2 years if you meet one of the following conditions: (1) You're estrogen deficient and at risk for osteoporosis based off medical history and other findings; (2) Have a vertebral abnormality; (3) On glucocorticoid therapy for more than 3 months; (4) Have primary hyperparathyroidism; (5) On osteoporosis medications and need to assess response to drug therapy  · Last bone density test (DXA Scan): Not on file  5  HIV Screening: covered annually if you're between the age of 12-76  Also covered annually if you are younger than 13 and older than 72 with risk factors for HIV infection  For pregnant patients, it is covered up to 3 times per pregnancy      Immunizations:  Immunization Recommendations   Influenza Vaccine Annual influenza vaccination during flu season is recommended for all persons aged >= 6 months who do not have contraindications   Pneumococcal Vaccine (Prevnar and Pneumovax)  * Prevnar = PCV13  * Pneumovax = PPSV23   Adults 25-60 years old: 1-3 doses may be recommended based on certain risk factors  Adults 72 years old: Prevnar (PCV13) vaccine recommended followed by Pneumovax (PPSV23) vaccine  If already received PPSV23 since turning 65, then PCV13 recommended at least one year after PPSV23 dose  Hepatitis B Vaccine 3 dose series if at intermediate or high risk (ex: diabetes, end stage renal disease, liver disease)   Tetanus (Td) Vaccine - COST NOT COVERED BY MEDICARE PART B Following completion of primary series, a booster dose should be given every 10 years to maintain immunity against tetanus  Td may also be given as tetanus wound prophylaxis  Tdap Vaccine - COST NOT COVERED BY MEDICARE PART B Recommended at least once for all adults  For pregnant patients, recommended with each pregnancy  Shingles Vaccine (Shingrix) - COST NOT COVERED BY MEDICARE PART B  2 shot series recommended in those aged 48 and above     Health Maintenance Due:      Topic Date Due    Hepatitis C Screening  Never done    MAMMOGRAM  Never done    Colonoscopy Surveillance  01/13/2014    Colorectal Cancer Screening  01/13/2021     Immunizations Due:      Topic Date Due    COVID-19 Vaccine (1) Never done    DTaP,Tdap,and Td Vaccines (1 - Tdap) Never done    Influenza Vaccine (Season Ended) 09/01/2021     Advance Directives   What are advance directives? Advance directives are legal documents that state your wishes and plans for medical care  These plans are made ahead of time in case you lose your ability to make decisions for yourself  Advance directives can apply to any medical decision, such as the treatments you want, and if you want to donate organs     What are the types of advance directives? There are many types of advance directives, and each state has rules about how to use them  You may choose a combination of any of the following:  · Living will: This is a written record of the treatment you want  You can also choose which treatments you do not want, which to limit, and which to stop at a certain time  This includes surgery, medicine, IV fluid, and tube feedings  · Durable power of  for healthcare [facility]): This is a written record that states who you want to make healthcare choices for you when you are unable to make them for yourself  This person, called a proxy, is usually a family member or a friend  You may choose more than 1 proxy  · Do not resuscitate (DNR) order:  A DNR order is used in case your heart stops beating or you stop breathing  It is a request not to have certain forms of treatment, such as CPR  A DNR order may be included in other types of advance directives  · Medical directive: This covers the care that you want if you are in a coma, near death, or unable to make decisions for yourself  You can list the treatments you want for each condition  Treatment may include pain medicine, surgery, blood transfusions, dialysis, IV or tube feedings, and a ventilator (breathing machine)  · Values history: This document has questions about your views, beliefs, and how you feel and think about life  This information can help others choose the care that you would choose  Why are advance directives important? An advance directive helps you control your care  Although spoken wishes may be used, it is better to have your wishes written down  Spoken wishes can be misunderstood, or not followed  Treatments may be given even if you do not want them  An advance directive may make it easier for your family to make difficult choices about your care  Urinary Incontinence   Urinary incontinence (UI)  is when you lose control of your bladder   UI develops because your bladder cannot store or empty urine properly  The 3 most common types of UI are stress incontinence, urge incontinence, or both  Medicines:   · May be given to help strengthen your bladder control  Report any side effects of medication to your healthcare provider  Do pelvic muscle exercises often:  Your pelvic muscles help you stop urinating  Squeeze these muscles tight for 5 seconds, then relax for 5 seconds  Gradually work up to squeezing for 10 seconds  Do 3 sets of 15 repetitions a day, or as directed  This will help strengthen your pelvic muscles and improve bladder control  Train your bladder:  Go to the bathroom at set times, such as every 2 hours, even if you do not feel the urge to go  You can also try to hold your urine when you feel the urge to go  For example, hold your urine for 5 minutes when you feel the urge to go  As that becomes easier, hold your urine for 10 minutes  Self-care:   · Keep a UI record  Write down how often you leak urine and how much you leak  Make a note of what you were doing when you leaked urine  · Drink liquids as directed  You may need to limit the amount of liquid you drink to help control your urine leakage  Do not drink any liquid right before you go to bed  Limit or do not have drinks that contain caffeine or alcohol  · Prevent constipation  Eat a variety of high-fiber foods  Good examples are high-fiber cereals, beans, vegetables, and whole-grain breads  Walking is the best way to trigger your intestines to have a bowel movement  · Exercise regularly and maintain a healthy weight  Weight loss and exercise will decrease pressure on your bladder and help you control your leakage  · Use a catheter as directed  to help empty your bladder  A catheter is a tiny, plastic tube that is put into your bladder to drain your urine  · Go to behavior therapy as directed  Behavior therapy may be used to help you learn to control your urge to urinate      Weight Management Why it is important to manage your weight:  Being overweight increases your risk of health conditions such as heart disease, high blood pressure, type 2 diabetes, and certain types of cancer  It can also increase your risk for osteoarthritis, sleep apnea, and other respiratory problems  Aim for a slow, steady weight loss  Even a small amount of weight loss can lower your risk of health problems  How to lose weight safely:  A safe and healthy way to lose weight is to eat fewer calories and get regular exercise  You can lose up about 1 pound a week by decreasing the number of calories you eat by 500 calories each day  Healthy meal plan for weight management:  A healthy meal plan includes a variety of foods, contains fewer calories, and helps you stay healthy  A healthy meal plan includes the following:  · Eat whole-grain foods more often  A healthy meal plan should contain fiber  Fiber is the part of grains, fruits, and vegetables that is not broken down by your body  Whole-grain foods are healthy and provide extra fiber in your diet  Some examples of whole-grain foods are whole-wheat breads and pastas, oatmeal, brown rice, and bulgur  · Eat a variety of vegetables every day  Include dark, leafy greens such as spinach, kale, armando greens, and mustard greens  Eat yellow and orange vegetables such as carrots, sweet potatoes, and winter squash  · Eat a variety of fruits every day  Choose fresh or canned fruit (canned in its own juice or light syrup) instead of juice  Fruit juice has very little or no fiber  · Eat low-fat dairy foods  Drink fat-free (skim) milk or 1% milk  Eat fat-free yogurt and low-fat cottage cheese  Try low-fat cheeses such as mozzarella and other reduced-fat cheeses  · Choose meat and other protein foods that are low in fat  Choose beans or other legumes such as split peas or lentils  Choose fish, skinless poultry (chicken or turkey), or lean cuts of red meat (beef or pork)   Before you cook meat or poultry, cut off any visible fat  · Use less fat and oil  Try baking foods instead of frying them  Add less fat, such as margarine, sour cream, regular salad dressing and mayonnaise to foods  Eat fewer high-fat foods  Some examples of high-fat foods include french fries, doughnuts, ice cream, and cakes  · Eat fewer sweets  Limit foods and drinks that are high in sugar  This includes candy, cookies, regular soda, and sweetened drinks  Exercise:  Exercise at least 30 minutes per day on most days of the week  Some examples of exercise include walking, biking, dancing, and swimming  You can also fit in more physical activity by taking the stairs instead of the elevator or parking farther away from stores  Ask your healthcare provider about the best exercise plan for you  © Copyright [employer] 2018 Information is for End User's use only and may not be sold, redistributed or otherwise used for commercial purposes   All illustrations and images included in CareNotes® are the copyrighted property of A [employer] M , [employer]  or [address] [place] [address]

## 2021-06-08 NOTE — PROGRESS NOTES
Assessment/Plan:    Fasting labs drawn as below  Will heed results  Patient given requisition schedule screening mammogram and agrees to complete cologuard testing  Discussed treatment options with patient  Patient will increase lisinopril to 20 mg daily and start Zetia 10 mg daily  Patient acted following low-fat, low-salt and low-carbohydrate diet more carefully and get regular aerobic exercise walking 150 minutes per week  Return the office in 6 months  Diagnoses and all orders for this visit:    Essential hypertension  -     lisinopril (ZESTRIL) 20 mg tablet; Take 1 tablet (20 mg total) by mouth daily  -     CBC and Platelet  -     Comprehensive metabolic panel  -     TSH, 3rd generation with Free T4 reflex  -     UA w Reflex to Microscopic w Reflex to Culture - Clinic Collect    Mixed hyperlipidemia  -     ezetimibe (ZETIA) 10 mg tablet; Take 1 tablet (10 mg total) by mouth daily  -     Comprehensive metabolic panel  -     Lipid panel    Medicare annual wellness visit, initial    Anxiety    Depression, unspecified depression type    Insomnia, unspecified type    Vitamin D deficiency  -     Vitamin D 25 hydroxy    Visit for screening mammogram  -     Mammo screening bilateral w 3d & cad; Future    Colon cancer screening  -     Cologuard; Future          Subjective:      Patient ID: Rashawn Joe is a 76 y o  female  Patient is here for follow-up appoint her chronic conditions and fasting labs  Also annual Medicare wellness exam   Patient has been feeling fairly well overall and significantly improved emotionally since her   1 year ago  Patient did not receive COVID vaccine and reluctant to do so  Patient is overdue for mammogram and agrees to schedule  Patient declines colonoscopy or other agrees to go other testing  Patient states she is intolerant to statins and she could not tolerate Crestor, Lipitor, Zocor or Pravachol in the past causing significant muscle aches    Patient has a follow-up appointment with Dr Olegario Cooley, ophthalmologist September this year  No regular exercise program of the patient remains active doing housework throughout the day  Hypertension  This is a chronic problem  Associated symptoms include anxiety, blurred vision and headaches  Pertinent negatives include no chest pain, orthopnea, palpitations, peripheral edema, PND or shortness of breath  Risk factors for coronary artery disease include dyslipidemia, obesity, post-menopausal state, family history and smoking/tobacco exposure  Past treatments include ACE inhibitors  The current treatment provides significant improvement  Compliance problems include exercise and diet  There is no history of CAD/MI or CVA  Hyperlipidemia  This is a chronic problem  The problem is uncontrolled  Recent lipid tests were reviewed and are high  Exacerbating diseases include hypothyroidism and obesity  She has no history of diabetes  Associated symptoms include myalgias  Pertinent negatives include no chest pain, focal sensory loss, focal weakness, leg pain or shortness of breath  She is currently on no antihyperlipidemic treatment  The current treatment provides no improvement of lipids  Compliance problems include adherence to exercise and adherence to diet  Anxiety  Presents for follow-up visit  Symptoms include excessive worry, insomnia and nervous/anxious behavior  Patient reports no chest pain, confusion, decreased concentration, depressed mood, hyperventilation, irritability, palpitations, panic, restlessness, shortness of breath or suicidal ideas  Symptoms occur occasionally  The quality of sleep is good  The following portions of the patient's history were reviewed and updated as appropriate: allergies, current medications, past family history, past medical history, past social history, past surgical history and problem list     Review of Systems   Constitutional: Negative for irritability     Eyes: Positive for blurred vision  Respiratory: Negative for shortness of breath  Cardiovascular: Negative for chest pain, palpitations, orthopnea and PND  Musculoskeletal: Positive for myalgias  Neurological: Positive for headaches  Negative for focal weakness  Psychiatric/Behavioral: Negative for confusion, decreased concentration and suicidal ideas  The patient is nervous/anxious and has insomnia  Objective:      /90   Pulse 76   Temp (!) 97 2 °F (36 2 °C) (Temporal)   Resp 16   Ht 5' 6" (1 676 m)   Wt 85 5 kg (188 lb 6 4 oz)   SpO2 95%   BMI 30 41 kg/m²          Physical Exam  Constitutional:       General: She is not in acute distress  Appearance: Normal appearance  HENT:      Head: Normocephalic  Mouth/Throat:      Mouth: Mucous membranes are moist    Eyes:      General: No scleral icterus  Conjunctiva/sclera: Conjunctivae normal    Neck:      Musculoskeletal: Neck supple  Vascular: No carotid bruit  Cardiovascular:      Rate and Rhythm: Normal rate and regular rhythm  Pulmonary:      Effort: Pulmonary effort is normal       Breath sounds: Normal breath sounds  Abdominal:      Palpations: Abdomen is soft  Tenderness: There is no abdominal tenderness  Musculoskeletal:      Right lower leg: No edema  Left lower leg: No edema  Lymphadenopathy:      Cervical: No cervical adenopathy  Skin:     General: Skin is warm and dry  Neurological:      General: No focal deficit present  Mental Status: She is alert and oriented to person, place, and time  Psychiatric:         Mood and Affect: Mood normal          Behavior: Behavior normal          Thought Content:  Thought content normal          Judgment: Judgment normal

## 2021-06-08 NOTE — PROGRESS NOTES
Assessment and Plan:     Problem List Items Addressed This Visit     None      Visit Diagnoses     Visit for screening mammogram    -  Primary        BMI Counseling: Body mass index is 30 41 kg/m²  The BMI is above normal  Nutrition recommendations include decreasing portion sizes, encouraging healthy choices of fruits and vegetables, decreasing fast food intake, consuming healthier snacks, limiting drinks that contain sugar, moderation in carbohydrate intake, increasing intake of lean protein, reducing intake of saturated and trans fat and reducing intake of cholesterol  Exercise recommendations include moderate physical activity 150 minutes/week  Preventive health issues were discussed with patient, and age appropriate screening tests were ordered as noted in patient's After Visit Summary  Personalized health advice and appropriate referrals for health education or preventive services given if needed, as noted in patient's After Visit Summary       History of Present Illness:     Patient presents for Medicare Annual Wellness visit    Patient Care Team:  Genet Ramsay DO as PCP - MD Raymond Phan DO     Problem List:     Patient Active Problem List   Diagnosis    Allergic rhinitis    Arthritis    Depression    Eczema    Fibromyalgia    Glenohumeral arthritis, left    Hyperlipidemia    Essential hypertension    Hypothyroidism    Insomnia    Primary localized osteoarthritis of left knee    Primary localized osteoarthritis of right knee    Vitamin D deficiency    Anxiety    Osteoarthritis of carpometacarpal (CMC) joint of thumb      Past Medical and Surgical History:     Past Medical History:   Diagnosis Date    Depression     Last assessed 5/26/2017     Hypertension     Last assessed 1/20/2016     Thyroid disease      Past Surgical History:   Procedure Laterality Date    BACK SURGERY      Spine repair     COLONOSCOPY  2014    NERVE BLOCK Left Peripheral nerve block, wrist, median       Family History:     Family History   Problem Relation Age of Onset    Lung cancer Mother     Skin cancer Father       Social History:     E-Cigarette/Vaping    E-Cigarette Use Never User      E-Cigarette/Vaping Substances    Nicotine No     THC No     CBD No     Flavoring No     Other No     Unknown No      Social History     Socioeconomic History    Marital status: /Civil Union     Spouse name: None    Number of children: None    Years of education: None    Highest education level: None   Occupational History    None   Social Needs    Financial resource strain: None    Food insecurity     Worry: None     Inability: None    Transportation needs     Medical: None     Non-medical: None   Tobacco Use    Smoking status: Former Smoker    Smokeless tobacco: Never Used   Substance and Sexual Activity    Alcohol use: Never     Frequency: Never    Drug use: Never    Sexual activity: None   Lifestyle    Physical activity     Days per week: None     Minutes per session: None    Stress: None   Relationships    Social connections     Talks on phone: None     Gets together: None     Attends Taoist service: None     Active member of club or organization: None     Attends meetings of clubs or organizations: None     Relationship status: None    Intimate partner violence     Fear of current or ex partner: None     Emotionally abused: None     Physically abused: None     Forced sexual activity: None   Other Topics Concern    None   Social History Narrative    None      Medications and Allergies:     Current Outpatient Medications   Medication Sig Dispense Refill    albuterol (PROVENTIL HFA,VENTOLIN HFA) 90 mcg/act inhaler INHALE 2 PUFFS BY MOUTH EVERY 6 HOURS AS NEEDED FOR WHEEZING OR SHORTNESS OF BREATH 25 5 g 1    butalbital-acetaminophen-caffeine (Esgic) -40 mg per tablet Take 1 tablet by mouth every 4 (four) hours as needed for headaches 30 tablet 0    lisinopril (ZESTRIL) 10 mg tablet TAKE 1 TABLET BY MOUTH EVERY DAY 90 tablet 0    LORazepam (ATIVAN) 0 5 mg tablet Take 1 tablet (0 5 mg total) by mouth every 8 (eight) hours as needed for anxiety 90 tablet 0    LUMIGAN 0 01 % ophthalmic drops INSTILL 1 DROP IN THE LEFT EYE HS      triamcinolone (KENALOG) 0 5 % cream Apply topically as needed (eczema) 30 g 0    venlafaxine (EFFEXOR-XR) 150 mg 24 hr capsule Take 1 capsule (150 mg total) by mouth daily 90 capsule 2    zolpidem (AMBIEN) 10 mg tablet Take 1 tablet (10 mg total) by mouth daily at bedtime as needed for sleep 90 tablet 0    Docusate Sodium 100 MG capsule Take by mouth      methylPREDNISolone 4 MG tablet therapy pack Use as directed on package 21 each 0    TRAVATAN Z 0 004 % ophthalmic solution        No current facility-administered medications for this visit  No Known Allergies   Immunizations:     Immunization History   Administered Date(s) Administered    Influenza, seasonal, injectable 10/27/2017    Pneumococcal Conjugate 13-Valent 09/21/2020    Tuberculin Skin Test-PPD Intradermal 08/28/2017      Health Maintenance:         Topic Date Due    Hepatitis C Screening  Never done    MAMMOGRAM  Never done    Colonoscopy Surveillance  01/13/2014    Colorectal Cancer Screening  01/13/2021         Topic Date Due    COVID-19 Vaccine (1) Never done    DTaP,Tdap,and Td Vaccines (1 - Tdap) Never done    Influenza Vaccine (Season Ended) 09/01/2021      Medicare Health Risk Assessment:     Pulse 78   Temp (!) 97 2 °F (36 2 °C) (Temporal)   Ht 5' 6" (1 676 m)   Wt 85 5 kg (188 lb 6 4 oz)   SpO2 95%   BMI 30 41 kg/m²      Shae Driscoll is here for her Initial Wellness visit  Health Risk Assessment:   Patient rates overall health as good  Patient feels that their physical health rating is same  Patient is satisfied with their life  Eyesight was rated as slightly worse  Hearing was rated as slightly worse   Patient feels that their emotional and mental health rating is much better  Patients states they are never, rarely angry  Patient states they are often unusually tired/fatigued  Pain experienced in the last 7 days has been some  Patient's pain rating has been 7/10  Patient states that she has experienced no weight loss or gain in last 6 months  Depression Screening:   PHQ-2 Score: 0  PHQ-9 Score: 1      Fall Risk Screening: In the past year, patient has experienced: no history of falling in past year      Urinary Incontinence Screening:   Patient has leaked urine accidently in the last six months  Home Safety:  Patient has trouble with stairs inside or outside of their home  Patient has working smoke alarms and has working carbon monoxide detector  Home safety hazards include: none  Nutrition:   Current diet is Regular and Limited junk food  Medications:   Patient is currently taking over-the-counter supplements  OTC medications include: see medication list  Patient is able to manage medications  Activities of Daily Living (ADLs)/Instrumental Activities of Daily Living (IADLs):   Walk and transfer into and out of bed and chair?: Yes  Dress and groom yourself?: Yes    Bathe or shower yourself?: Yes    Feed yourself?  Yes  Do your laundry/housekeeping?: Yes  Manage your money, pay your bills and track your expenses?: Yes  Make your own meals?: Yes    Do your own shopping?: Yes    Previous Hospitalizations:   Any hospitalizations or ED visits within the last 12 months?: No      Advance Care Planning:   Living will: Yes    Durable POA for healthcare: No    Advanced directive: Yes    Advanced directive counseling given: Yes      Cognitive Screening:   Provider or family/friend/caregiver concerned regarding cognition?: No    PREVENTIVE SCREENINGS      Cardiovascular Screening:    General: History Lipid Disorder and Risks and Benefits Discussed    Due for: Lipid Panel      Diabetes Screening:     General: Risks and Benefits Discussed    Due for: Blood Glucose      Colorectal Cancer Screening:     General: Risks and Benefits Discussed    Due for: Cologuard      Breast Cancer Screening:     General: Risks and Benefits Discussed    Due for: Mammogram        Cervical Cancer Screening:    General: Screening Not Indicated and Risks and Benefits Discussed      Osteoporosis Screening:    General: Risks and Benefits Discussed and Patient Declines      Abdominal Aortic Aneurysm (AAA) Screening:        General: Risks and Benefits Discussed and Screening Not Indicated      Lung Cancer Screening:     General: Screening Not Indicated and Risks and Benefits Discussed      Hepatitis C Screening:    General: Risks and Benefits Discussed    Hep C Screening Accepted: Yes      Screening, Brief Intervention, and Referral to Treatment (SBIRT)    Screening  Typical number of drinks in a day: 0    Single Item Drug Screening:  How often have you used an illegal drug (including marijuana) or a prescription medication for non-medical reasons in the past year? never    Single Item Drug Screen Score: 0  Interpretation: Negative screen for possible drug use disorder    Brief Intervention  Alcohol & drug use screenings were reviewed  No concerns regarding substance use disorder identified  Other Counseling Topics:   Car/seat belt/driving safety, skin self-exam, sunscreen and calcium and vitamin D intake and regular weightbearing exercise         Tian Salcedo DO

## 2021-06-10 LAB
25(OH)D3 SERPL-MCNC: 46 NG/ML (ref 30–100)
ALBUMIN SERPL-MCNC: 4.4 G/DL (ref 3.6–5.1)
ALBUMIN/GLOB SERPL: 1.8 (CALC) (ref 1–2.5)
ALP SERPL-CCNC: 83 U/L (ref 37–153)
ALT SERPL-CCNC: 12 U/L (ref 6–29)
APPEARANCE UR: CLEAR
AST SERPL-CCNC: 13 U/L (ref 10–35)
BACTERIA UR QL AUTO: ABNORMAL /HPF
BILIRUB SERPL-MCNC: 0.4 MG/DL (ref 0.2–1.2)
BILIRUB UR QL STRIP: NEGATIVE
BUN SERPL-MCNC: 17 MG/DL (ref 7–25)
BUN/CREAT SERPL: ABNORMAL (CALC) (ref 6–22)
CALCIUM SERPL-MCNC: 9.8 MG/DL (ref 8.6–10.4)
CHLORIDE SERPL-SCNC: 101 MMOL/L (ref 98–110)
CHOLEST SERPL-MCNC: 305 MG/DL
CHOLEST/HDLC SERPL: 6.6 (CALC)
CO2 SERPL-SCNC: 26 MMOL/L (ref 20–32)
COLOR UR: YELLOW
CREAT SERPL-MCNC: 0.79 MG/DL (ref 0.5–0.99)
ERYTHROCYTE [DISTWIDTH] IN BLOOD BY AUTOMATED COUNT: 12.3 % (ref 11–15)
GLOBULIN SER CALC-MCNC: 2.5 G/DL (CALC) (ref 1.9–3.7)
GLUCOSE SERPL-MCNC: 100 MG/DL (ref 65–99)
GLUCOSE UR QL STRIP: NEGATIVE
HCT VFR BLD AUTO: 40.2 % (ref 35–45)
HDLC SERPL-MCNC: 46 MG/DL
HGB BLD-MCNC: 13.6 G/DL (ref 11.7–15.5)
HGB UR QL STRIP: ABNORMAL
HYALINE CASTS #/AREA URNS LPF: ABNORMAL /LPF
KETONES UR QL STRIP: NEGATIVE
LEUKOCYTE ESTERASE UR QL STRIP: ABNORMAL
MCH RBC QN AUTO: 31.6 PG (ref 27–33)
MCHC RBC AUTO-ENTMCNC: 33.8 G/DL (ref 32–36)
MCV RBC AUTO: 93.3 FL (ref 80–100)
NITRITE UR QL STRIP: NEGATIVE
NONHDLC SERPL-MCNC: 259 MG/DL (CALC)
PH UR STRIP: 6.5 [PH] (ref 5–8)
PLATELET # BLD AUTO: 282 THOUSAND/UL (ref 140–400)
PMV BLD REES-ECKER: 10.5 FL (ref 7.5–12.5)
POTASSIUM SERPL-SCNC: 4.8 MMOL/L (ref 3.5–5.3)
PROT SERPL-MCNC: 6.9 G/DL (ref 6.1–8.1)
PROT UR QL STRIP: NEGATIVE
RBC # BLD AUTO: 4.31 MILLION/UL (ref 3.8–5.1)
RBC #/AREA URNS HPF: ABNORMAL /HPF
SL AMB EGFR AFRICAN AMERICAN: 90 ML/MIN/1.73M2
SL AMB EGFR NON AFRICAN AMERICAN: 78 ML/MIN/1.73M2
SODIUM SERPL-SCNC: 138 MMOL/L (ref 135–146)
SP GR UR STRIP: 1.02 (ref 1–1.03)
SQUAMOUS #/AREA URNS HPF: ABNORMAL /HPF
TRIGL SERPL-MCNC: 425 MG/DL
TSH SERPL-ACNC: 0.98 MIU/L (ref 0.4–4.5)
WBC # BLD AUTO: 6.6 THOUSAND/UL (ref 3.8–10.8)
WBC #/AREA URNS HPF: ABNORMAL /HPF

## 2021-07-02 ENCOUNTER — HOSPITAL ENCOUNTER (OUTPATIENT)
Dept: MAMMOGRAPHY | Facility: MEDICAL CENTER | Age: 66
Discharge: HOME | End: 2021-07-02
Payer: COMMERCIAL

## 2021-07-02 VITALS — WEIGHT: 188 LBS | BODY MASS INDEX: 30.22 KG/M2 | HEIGHT: 66 IN

## 2021-07-02 DIAGNOSIS — Z12.31 VISIT FOR SCREENING MAMMOGRAM: ICD-10-CM

## 2021-07-02 PROCEDURE — 77063 BREAST TOMOSYNTHESIS BI: CPT

## 2021-07-02 PROCEDURE — 77067 SCR MAMMO BI INCL CAD: CPT

## 2021-07-07 DIAGNOSIS — F32.A DEPRESSION, UNSPECIFIED DEPRESSION TYPE: ICD-10-CM

## 2021-07-07 RX ORDER — VENLAFAXINE HYDROCHLORIDE 150 MG/1
CAPSULE, EXTENDED RELEASE ORAL
Qty: 90 CAPSULE | Refills: 2 | Status: SHIPPED | OUTPATIENT
Start: 2021-07-07 | End: 2021-10-06 | Stop reason: SDUPTHER

## 2021-08-27 DIAGNOSIS — G47.00 INSOMNIA, UNSPECIFIED TYPE: ICD-10-CM

## 2021-08-27 RX ORDER — ZOLPIDEM TARTRATE 10 MG/1
10 TABLET ORAL
Qty: 90 TABLET | Refills: 0 | Status: SHIPPED | OUTPATIENT
Start: 2021-08-27 | End: 2021-11-26 | Stop reason: SDUPTHER

## 2021-10-06 DIAGNOSIS — Z01.10 ENCOUNTER FOR HEARING EXAMINATION, UNSPECIFIED WHETHER ABNORMAL FINDINGS: Primary | ICD-10-CM

## 2021-10-06 DIAGNOSIS — F32.A DEPRESSION, UNSPECIFIED DEPRESSION TYPE: ICD-10-CM

## 2021-10-06 RX ORDER — VENLAFAXINE HYDROCHLORIDE 150 MG/1
150 CAPSULE, EXTENDED RELEASE ORAL DAILY
Qty: 90 CAPSULE | Refills: 1 | Status: SHIPPED | OUTPATIENT
Start: 2021-10-06 | End: 2022-02-07 | Stop reason: SDUPTHER

## 2021-11-10 ENCOUNTER — VBI (OUTPATIENT)
Dept: ADMINISTRATIVE | Facility: OTHER | Age: 66
End: 2021-11-10

## 2021-11-26 DIAGNOSIS — I10 ESSENTIAL HYPERTENSION: ICD-10-CM

## 2021-11-26 DIAGNOSIS — G47.00 INSOMNIA, UNSPECIFIED TYPE: ICD-10-CM

## 2021-11-26 RX ORDER — LISINOPRIL 20 MG/1
20 TABLET ORAL DAILY
Qty: 90 TABLET | Refills: 0 | Status: SHIPPED | OUTPATIENT
Start: 2021-11-26 | End: 2022-02-07 | Stop reason: SDUPTHER

## 2021-11-26 RX ORDER — ZOLPIDEM TARTRATE 10 MG/1
10 TABLET ORAL
Qty: 90 TABLET | Refills: 0 | Status: SHIPPED | OUTPATIENT
Start: 2021-11-26 | End: 2022-02-07 | Stop reason: SDUPTHER

## 2022-02-07 ENCOUNTER — OFFICE VISIT (OUTPATIENT)
Dept: FAMILY MEDICINE CLINIC | Facility: CLINIC | Age: 67
End: 2022-02-07
Payer: COMMERCIAL

## 2022-02-07 VITALS
RESPIRATION RATE: 14 BRPM | TEMPERATURE: 96.4 F | OXYGEN SATURATION: 98 % | HEART RATE: 76 BPM | BODY MASS INDEX: 30.04 KG/M2 | HEIGHT: 67 IN | SYSTOLIC BLOOD PRESSURE: 138 MMHG | DIASTOLIC BLOOD PRESSURE: 84 MMHG | WEIGHT: 191.4 LBS

## 2022-02-07 DIAGNOSIS — F32.A DEPRESSION, UNSPECIFIED DEPRESSION TYPE: ICD-10-CM

## 2022-02-07 DIAGNOSIS — J20.9 BRONCHITIS, ACUTE, WITH BRONCHOSPASM: ICD-10-CM

## 2022-02-07 DIAGNOSIS — G47.00 INSOMNIA, UNSPECIFIED TYPE: ICD-10-CM

## 2022-02-07 DIAGNOSIS — R51.9 ACUTE NONINTRACTABLE HEADACHE, UNSPECIFIED HEADACHE TYPE: ICD-10-CM

## 2022-02-07 DIAGNOSIS — M54.6 ACUTE BILATERAL THORACIC BACK PAIN: Primary | ICD-10-CM

## 2022-02-07 DIAGNOSIS — I10 ESSENTIAL HYPERTENSION: ICD-10-CM

## 2022-02-07 PROCEDURE — 99214 OFFICE O/P EST MOD 30 MIN: CPT | Performed by: FAMILY MEDICINE

## 2022-02-07 RX ORDER — ZOLPIDEM TARTRATE 10 MG/1
10 TABLET ORAL
Qty: 90 TABLET | Refills: 1 | Status: SHIPPED | OUTPATIENT
Start: 2022-02-07 | End: 2022-08-09 | Stop reason: SDUPTHER

## 2022-02-07 RX ORDER — BUTALBITAL, ACETAMINOPHEN AND CAFFEINE 50; 325; 40 MG/1; MG/1; MG/1
1 TABLET ORAL EVERY 4 HOURS PRN
Qty: 30 TABLET | Refills: 0 | Status: SHIPPED | OUTPATIENT
Start: 2022-02-07

## 2022-02-07 RX ORDER — VENLAFAXINE HYDROCHLORIDE 150 MG/1
150 CAPSULE, EXTENDED RELEASE ORAL DAILY
Qty: 90 CAPSULE | Refills: 1 | Status: SHIPPED | OUTPATIENT
Start: 2022-02-07 | End: 2022-08-09 | Stop reason: SDUPTHER

## 2022-02-07 RX ORDER — METHOCARBAMOL 500 MG/1
500 TABLET, FILM COATED ORAL 2 TIMES DAILY PRN
Qty: 30 TABLET | Refills: 0 | Status: SHIPPED | OUTPATIENT
Start: 2022-02-07

## 2022-02-07 RX ORDER — MELOXICAM 15 MG/1
15 TABLET ORAL DAILY
Qty: 30 TABLET | Refills: 2 | Status: SHIPPED | OUTPATIENT
Start: 2022-02-07

## 2022-02-07 RX ORDER — LISINOPRIL 20 MG/1
20 TABLET ORAL DAILY
Qty: 90 TABLET | Refills: 1 | Status: SHIPPED | OUTPATIENT
Start: 2022-02-07 | End: 2022-08-09 | Stop reason: SDUPTHER

## 2022-02-07 RX ORDER — ALBUTEROL SULFATE 90 UG/1
2 AEROSOL, METERED RESPIRATORY (INHALATION) EVERY 6 HOURS PRN
Qty: 25.5 G | Refills: 1 | Status: SHIPPED | OUTPATIENT
Start: 2022-02-07

## 2022-02-07 NOTE — PROGRESS NOTES
Assessment/Plan:  Patient was started on meloxicam 15 mg 1 daily with food and instructed to discontinue ibuprofen and avoid naproxen  Patient was started on methocarbamol 500 mg 1 b i d  p r n , caution regarding drowsiness  Patient instructed to apply ice alternating with heat for 20 minutes each 2-3 times daily and rest   Discussed referral for physical therapy evaluation and treatment if not improving  Return the office in 1-2 weeks or call sooner p r n  Emdundo Simple Diagnoses and all orders for this visit:    Acute bilateral thoracic back pain  -     meloxicam (Mobic) 15 mg tablet; Take 1 tablet (15 mg total) by mouth daily  -     methocarbamol (ROBAXIN) 500 mg tablet; Take 1 tablet (500 mg total) by mouth 2 (two) times a day as needed for muscle spasms    Essential hypertension  -     lisinopril (ZESTRIL) 20 mg tablet; Take 1 tablet (20 mg total) by mouth daily    Acute nonintractable headache, unspecified headache type  -     butalbital-acetaminophen-caffeine (Esgic) -40 mg per tablet; Take 1 tablet by mouth every 4 (four) hours as needed for headaches    Depression, unspecified depression type  -     venlafaxine (EFFEXOR-XR) 150 mg 24 hr capsule; Take 1 capsule (150 mg total) by mouth daily    Insomnia, unspecified type  -     zolpidem (AMBIEN) 10 mg tablet; Take 1 tablet (10 mg total) by mouth daily at bedtime as needed for sleep    Bronchitis, acute, with bronchospasm  -     albuterol (PROVENTIL HFA,VENTOLIN HFA) 90 mcg/act inhaler; Inhale 2 puffs every 6 (six) hours as needed for wheezing          Subjective:      Patient ID: Lisa Garcia is a 78 y o  female  Patient complains of bilateral upper back pain between her shoulder blades for the past 1 week after lifting and carrying heavy plan at home  She admits to occasional pain shooting down her back and into her arms  Patient has treated this with ibuprofen, ice and heat without significant relief    Patient requests muscle relaxer and medication refills  Back Pain  This is a new problem  The current episode started in the past 7 days  The problem occurs constantly  The problem has been gradually improving since onset  The pain is present in the thoracic spine (bilat)  The quality of the pain is described as shooting (sharp)  The pain is the same all the time  The symptoms are aggravated by bending, twisting and coughing  Stiffness is present in the morning  Pertinent negatives include no abdominal pain, bladder incontinence, bowel incontinence, chest pain, headaches, leg pain, numbness, paresthesias, tingling or weakness  She has tried NSAIDs, ice and heat for the symptoms  The treatment provided mild relief  The following portions of the patient's history were reviewed and updated as appropriate: allergies, current medications, past family history, past medical history, past social history, past surgical history and problem list     Review of Systems   Cardiovascular: Negative for chest pain  Gastrointestinal: Negative for abdominal pain and bowel incontinence  Genitourinary: Negative for bladder incontinence  Musculoskeletal: Positive for back pain  Neurological: Negative for tingling, weakness, numbness, headaches and paresthesias  Objective:      /84 (BP Location: Left arm, Patient Position: Sitting, Cuff Size: Standard)   Pulse 76   Temp (!) 96 4 °F (35 8 °C) (Temporal)   Resp 14   Ht 5' 7" (1 702 m)   Wt 86 8 kg (191 lb 6 4 oz)   SpO2 98%   BMI 29 98 kg/m²          Physical Exam  Constitutional:       General: She is not in acute distress  Appearance: Normal appearance  HENT:      Head: Normocephalic  Eyes:      General: No scleral icterus  Conjunctiva/sclera: Conjunctivae normal    Cardiovascular:      Rate and Rhythm: Normal rate and regular rhythm  Pulmonary:      Effort: Pulmonary effort is normal       Breath sounds: Normal breath sounds  Abdominal:      Palpations: Abdomen is soft  Tenderness: There is no abdominal tenderness  Musculoskeletal:         General: Tenderness present  Cervical back: Neck supple  Right lower leg: No edema  Left lower leg: No edema  Comments: Positive bilateral upper thoracic paravertebral and trapezius muscle tenderness  Upper extremity strength and DTRs intact  Lymphadenopathy:      Cervical: No cervical adenopathy  Neurological:      General: No focal deficit present  Mental Status: She is alert and oriented to person, place, and time  Psychiatric:         Mood and Affect: Mood normal          Behavior: Behavior normal          Thought Content:  Thought content normal          Judgment: Judgment normal

## 2022-02-14 ENCOUNTER — OFFICE VISIT (OUTPATIENT)
Dept: FAMILY MEDICINE CLINIC | Facility: CLINIC | Age: 67
End: 2022-02-14
Payer: COMMERCIAL

## 2022-02-14 VITALS
HEART RATE: 84 BPM | BODY MASS INDEX: 30.05 KG/M2 | OXYGEN SATURATION: 96 % | TEMPERATURE: 96.6 F | HEIGHT: 66 IN | RESPIRATION RATE: 16 BRPM | SYSTOLIC BLOOD PRESSURE: 150 MMHG | DIASTOLIC BLOOD PRESSURE: 90 MMHG | WEIGHT: 187 LBS

## 2022-02-14 DIAGNOSIS — M19.90 ARTHRITIS: ICD-10-CM

## 2022-02-14 DIAGNOSIS — M54.6 ACUTE BILATERAL THORACIC BACK PAIN: ICD-10-CM

## 2022-02-14 DIAGNOSIS — M54.2 NECK PAIN: Primary | ICD-10-CM

## 2022-02-14 PROCEDURE — 1160F RVW MEDS BY RX/DR IN RCRD: CPT | Performed by: FAMILY MEDICINE

## 2022-02-14 PROCEDURE — 3008F BODY MASS INDEX DOCD: CPT | Performed by: FAMILY MEDICINE

## 2022-02-14 PROCEDURE — 3077F SYST BP >= 140 MM HG: CPT | Performed by: FAMILY MEDICINE

## 2022-02-14 PROCEDURE — 99214 OFFICE O/P EST MOD 30 MIN: CPT | Performed by: FAMILY MEDICINE

## 2022-02-14 PROCEDURE — 1036F TOBACCO NON-USER: CPT | Performed by: FAMILY MEDICINE

## 2022-02-14 PROCEDURE — 3080F DIAST BP >= 90 MM HG: CPT | Performed by: FAMILY MEDICINE

## 2022-02-14 RX ORDER — METHYLPREDNISOLONE 4 MG/1
TABLET ORAL
Qty: 21 EACH | Refills: 0 | Status: SHIPPED | OUTPATIENT
Start: 2022-02-14

## 2022-02-14 NOTE — PROGRESS NOTES
Assessment/Plan:  Discussed diagnostic and treatment options with patient  Patient is being sent for x-ray of the cervical spine  Patient is being sent for labs for CBC, WES, rheumatoid factor, sed rate and C-reactive protein  We will heed results  Patient was started on Medrol Dosepak and instructed to hold meloxicam   She may continue methocarbamol 500 mg q 6 hours p r n     Continue ice alternating with heat p r n     Again discussed referral for physical therapy evaluation and treatment  Patient is being referred to Dr Edgard Marrufo, rheumatologist for further evaluation and treatment  During the office p r n  Nitin Mckinney Diagnoses and all orders for this visit:    Neck pain  -     XR spine cervical complete 4 or 5 vw non injury; Future  -     methylPREDNISolone 4 MG tablet therapy pack; Use as directed on package  -     Ambulatory Referral to Rheumatology; Future    Acute bilateral thoracic back pain  -     methylPREDNISolone 4 MG tablet therapy pack; Use as directed on package  -     Ambulatory Referral to Rheumatology; Future    Arthritis  -     CBC and Platelet; Future  -     WES Screen w/ Reflex to Titer/Pattern; Future  -     RF Screen w/ Reflex to Titer; Future  -     Sedimentation rate, automated; Future  -     C-reactive protein; Future  -     methylPREDNISolone 4 MG tablet therapy pack; Use as directed on package  -     Ambulatory Referral to Rheumatology; Future          Subjective:      Patient ID: Rashawn Joe is a 78 y o  female  Patient is being seen in follow-up for neck, upper back and bilateral shoulder pain over the past few weeks  Patient admits to history of arthritis and symptoms were exacerbated after lifting a heavy plant at home  No significant relief with meloxicam and methocarbamol  She complains of intermittent pain, numbness and tingling radiating down her arms  Neck Pain   This is a new problem  The current episode started 1 to 4 weeks ago  The problem occurs constantly   The problem has been gradually worsening  The pain is associated with lifting a heavy object  The pain is present in the left side and right side  The quality of the pain is described as aching and shooting  The symptoms are aggravated by coughing, sneezing, twisting and bending  The pain is same all the time  Stiffness is present in the morning  Associated symptoms include numbness and tingling  Pertinent negatives include no chest pain, headaches or weakness  She has tried NSAIDs, muscle relaxants, ice and heat for the symptoms  The treatment provided mild relief  Shoulder Pain   The pain is present in the left shoulder and right shoulder  This is a new problem  The current episode started 1 to 4 weeks ago  The problem has been gradually worsening  The quality of the pain is described as aching  Associated symptoms include a limited range of motion, numbness and tingling  The symptoms are aggravated by activity  She has tried NSAIDS, cold and heat for the symptoms  The treatment provided mild relief  Her past medical history is significant for osteoarthritis  The following portions of the patient's history were reviewed and updated as appropriate: allergies, current medications, past family history, past medical history, past social history, past surgical history and problem list     Review of Systems   Cardiovascular: Negative for chest pain  Musculoskeletal: Positive for neck pain  Neurological: Positive for tingling and numbness  Negative for weakness and headaches  Objective:      /90 (BP Location: Left arm, Patient Position: Sitting, Cuff Size: Standard)   Pulse 84   Temp (!) 96 6 °F (35 9 °C) (Skin)   Resp 16   Ht 5' 6" (1 676 m)   Wt 84 8 kg (187 lb)   SpO2 96%   BMI 30 18 kg/m²          Physical Exam  Constitutional:       General: She is in acute distress  Appearance: Normal appearance  Comments: Secondary to pain   HENT:      Head: Normocephalic        Mouth/Throat: Mouth: Mucous membranes are moist    Eyes:      General: No scleral icterus  Conjunctiva/sclera: Conjunctivae normal    Neck:      Comments: Neck reveals decreased range of motion and cervical paravertebral and trapezius muscle tenderness bilaterally  Upper extremity strength and DTRs intact  Cardiovascular:      Rate and Rhythm: Normal rate and regular rhythm  Pulmonary:      Effort: Pulmonary effort is normal       Breath sounds: Normal breath sounds  Abdominal:      Palpations: Abdomen is soft  Tenderness: There is no abdominal tenderness  Musculoskeletal:      Cervical back: Neck supple  Tenderness present  Right lower leg: No edema  Left lower leg: No edema  Lymphadenopathy:      Cervical: No cervical adenopathy  Skin:     General: Skin is warm and dry  Neurological:      General: No focal deficit present  Mental Status: She is alert and oriented to person, place, and time  Psychiatric:         Mood and Affect: Mood normal          Behavior: Behavior normal          Thought Content:  Thought content normal          Judgment: Judgment normal

## 2022-02-15 ENCOUNTER — APPOINTMENT (OUTPATIENT)
Dept: LAB | Facility: IMAGING CENTER | Age: 67
End: 2022-02-15
Payer: COMMERCIAL

## 2022-02-15 ENCOUNTER — HOSPITAL ENCOUNTER (OUTPATIENT)
Dept: RADIOLOGY | Facility: IMAGING CENTER | Age: 67
Discharge: HOME | End: 2022-02-15
Payer: COMMERCIAL

## 2022-02-15 DIAGNOSIS — M19.90 ARTHRITIS: ICD-10-CM

## 2022-02-15 DIAGNOSIS — M54.2 NECK PAIN: ICD-10-CM

## 2022-02-15 LAB
CRP SERPL QL: <3 MG/L
ERYTHROCYTE [DISTWIDTH] IN BLOOD BY AUTOMATED COUNT: 12.5 % (ref 11.6–15.1)
ERYTHROCYTE [SEDIMENTATION RATE] IN BLOOD: 18 MM/HOUR (ref 0–29)
HCT VFR BLD AUTO: 45.4 % (ref 34.8–46.1)
HGB BLD-MCNC: 14.7 G/DL (ref 11.5–15.4)
MCH RBC QN AUTO: 31.3 PG (ref 26.8–34.3)
MCHC RBC AUTO-ENTMCNC: 32.4 G/DL (ref 31.4–37.4)
MCV RBC AUTO: 97 FL (ref 82–98)
PLATELET # BLD AUTO: 330 THOUSANDS/UL (ref 149–390)
PMV BLD AUTO: 10.2 FL (ref 8.9–12.7)
RBC # BLD AUTO: 4.69 MILLION/UL (ref 3.81–5.12)
WBC # BLD AUTO: 12.72 THOUSAND/UL (ref 4.31–10.16)

## 2022-02-15 PROCEDURE — 36415 COLL VENOUS BLD VENIPUNCTURE: CPT

## 2022-02-15 PROCEDURE — 85652 RBC SED RATE AUTOMATED: CPT

## 2022-02-15 PROCEDURE — 72050 X-RAY EXAM NECK SPINE 4/5VWS: CPT

## 2022-02-15 PROCEDURE — 86140 C-REACTIVE PROTEIN: CPT

## 2022-02-15 PROCEDURE — 86430 RHEUMATOID FACTOR TEST QUAL: CPT

## 2022-02-15 PROCEDURE — 85027 COMPLETE CBC AUTOMATED: CPT

## 2022-02-15 PROCEDURE — 86038 ANTINUCLEAR ANTIBODIES: CPT

## 2022-02-16 DIAGNOSIS — D72.829 LEUKOCYTOSIS, UNSPECIFIED TYPE: Primary | ICD-10-CM

## 2022-02-16 LAB
RHEUMATOID FACT SER QL LA: NEGATIVE
RYE IGE QN: NEGATIVE

## 2022-03-01 ENCOUNTER — EVALUATION (OUTPATIENT)
Dept: PHYSICAL THERAPY | Facility: REHABILITATION | Age: 67
End: 2022-03-01
Payer: COMMERCIAL

## 2022-03-01 DIAGNOSIS — F43.21 GRIEF REACTION: ICD-10-CM

## 2022-03-01 DIAGNOSIS — F32.A DEPRESSION, UNSPECIFIED DEPRESSION TYPE: Primary | ICD-10-CM

## 2022-03-01 DIAGNOSIS — F41.9 ANXIETY: ICD-10-CM

## 2022-03-01 DIAGNOSIS — M54.2 CERVICALGIA: Primary | ICD-10-CM

## 2022-03-01 PROCEDURE — 97110 THERAPEUTIC EXERCISES: CPT | Performed by: PHYSICAL THERAPIST

## 2022-03-01 PROCEDURE — 97161 PT EVAL LOW COMPLEX 20 MIN: CPT | Performed by: PHYSICAL THERAPIST

## 2022-03-01 NOTE — PROGRESS NOTES
PT Evaluation     Today's date: 3/1/2022  Patient name: Dora Merrill  : 1955  MRN: [id number]  Referring provider: Jon Elizondo  Dx:   Encounter Diagnosis     ICD-10-CM    1  Cervicalgia  M54 2                   Assessment  Assessment details: Pt is a 78year old female that presents to outpatient physical therapy with cervicalgia (primary encounter diagnosis)  Pt demonstrates muscular hypertonicity in cervical and thoracic region, decreased ROM, decreased strength, activity intolerance, and limited functional mobility  Pt would benefit from skilled physical therapy to address noted impairments and to meet patient goals  Impairments: abnormal gait, abnormal muscle firing, abnormal muscle tone, abnormal or restricted ROM, abnormal movement, activity intolerance, impaired balance, impaired physical strength, lacks appropriate home exercise program and pain with function    Goals  STGs:   1  Pt will be able to demonstrate increase in ROM by at least 25% within 3 weeks   2  Pt will be able to demonstrate increased strength by at least 1/2 grade within 3 weeks  3  Pt will be able to report less than 4/10 pain with active ROM in all planes within 3 weeks  LTGs:  1  Pt will be able to return to IADLs without reports of pain upon discharge   2  Pt will be independent with HEP upon discharge   3  Pt will be able to tolerate at least 15 minutes of walking in the community without pain upon discharge       Plan  Planned modality interventions: TENS, cryotherapy and thermotherapy: hydrocollator packs  Planned therapy interventions: IADL retraining, manual therapy, neuromuscular re-education, patient education, postural training, strengthening, stretching, therapeutic activities, therapeutic exercise, home exercise program, gait training, functional ROM exercises, flexibility, balance and breathing training  Frequency: 2x week  Duration in visits: 10  Duration in weeks: 5  Treatment plan discussed with: patient        Subjective Evaluation    History of Present Illness  Mechanism of injury: 3/1/2022  Pt reports that she had a fall approximately 1 month ago when walking, and states that she fell on her L shoulder  She indicates that following the fall, she had severe neck and shoulder pain bilaterally  Reports that she has been more unsteady on her feet for the past 6 months which was around the time she moved  States that she is not active at home and does not go out much  She reports that she received a steroid injection about 2 weeks ago which relieve her symptoms  Today, she states that her symptoms are primarily an ache located in her mid-lower cervical that go to both of her shoulders and down her mid back  She also states that she feels weak in her hands and legs  AGGS: sitting, all cervical motions,   EASES: ice and heat   Goals: return to day to day without pain     X-ray found multilevel degenerative spondylosis  Denies nausea, double vision, headaches, blurred vision, difficulty swallowing  Denies numbness/tingling in UEs           Objective     Palpation     Additional Palpation Details  Tenderness with light palpation to bilateral upper trap, levator scap , cervical/thoracic paraspinals, and suboccipitals     Tenderness     Additional Tenderness Details  Pain with light PA pressure to C2-C4 in seated position     Neurological Testing     Sensation   Cervical/Thoracic   Left   Intact: light touch    Right   Intact: light touch    Reflexes   Left   Biceps (C5/C6): brisk (3+)  Brachioradialis (C6): brisk (3+)  Triceps (C7): brisk (3+)  Vasquez's reflex: positive    Right   Biceps (C5/C6): brisk (3+)  Brachioradialis (C6): brisk (3+)  Triceps (C7): brisk (3+)  Vasquez's reflex: positive    Additional Neurological Details  Mild decrease in sensation to C4 on R       Active Range of Motion   Cervical/Thoracic Spine       Cervical    Flexion: 25 degrees  with pain  Extension: 23 degrees Left lateral flexion: 5 degrees     with pain  Right lateral flexion: 8 degrees     with pain  Left rotation: 30 degrees with pain  Right rotation: 25 degrees    with pain    Strength/Myotome Testing     Left Shoulder     Planes of Motion   Flexion: 4-   Abduction: 4-   External rotation at 0°: 4   Internal rotation at 0°: 4     Right Shoulder     Planes of Motion   Flexion: 3+   Abduction: 4-   External rotation at 0°: 4   Internal rotation at 0°: 4     Left Elbow   Flexion: 4+    Right Elbow   Flexion: 4    Left Wrist/Hand   Wrist extension: 3+  Wrist flexion: 3+  Thumb extension: 3+    Right Wrist/Hand   Wrist extension: 4  Wrist flexion: 4  Thumb extension: 4+    Additional Strength Details  Pain in mid thoracic with MMT to shoulder flexion, abduction, and elbow flexion    Tests   Cervical   Negative alar ligament test and Sharp-Piedad test      Left   Positive Spurling's Test A  Right   Positive Spurling's Test A  Precautions: arthritis, hypertension, depression    Daily Treatment Diary    Date 3/1            FOTO IE            Re-Eval IE               Manuals    Cervical distraction              Cervical/thoracic STM                                        Neuro Re-Ed                                                                                                Ther Ex    UT stretch 20"x2            LS stretch  20"x2            Seated cervical retraction                                                                               Ther Activity    Arm bike                           Gait Training                              Modalities                                Evaluation and treatment was performed by student physical therapistZeeshan  Session was supervised throughout by [employer]

## 2022-03-08 ENCOUNTER — OFFICE VISIT (OUTPATIENT)
Dept: PHYSICAL THERAPY | Facility: REHABILITATION | Age: 67
End: 2022-03-08
Payer: COMMERCIAL

## 2022-03-08 DIAGNOSIS — M54.2 CERVICALGIA: Primary | ICD-10-CM

## 2022-03-08 PROCEDURE — 97110 THERAPEUTIC EXERCISES: CPT

## 2022-03-08 PROCEDURE — 97140 MANUAL THERAPY 1/> REGIONS: CPT

## 2022-03-08 NOTE — PROGRESS NOTES
Daily Note     Today's date: 3/8/2022  Patient name: Emily Aburto  : 1955  MRN: [id number]  Referring provider: Kristy Lovell  Dx:   Encounter Diagnosis     ICD-10-CM    1  Cervicalgia  M54 2                   Subjective:  Brandon Martell reports that her neck is feeling better- the stretches seem to really help  She notes some ongoing tightness on her R side  Objective: See treatment diary below      Assessment: Patient tolerated treatment well  Patient was seen for her first follow up visit since her initial evaluation  Patient performed ex and received manual therapy as noted  Added cervical retractions and UBE retro  Patient responded well to treatment and noted decreased tightness post treatment  Patient demonstrated a good understanding of the exercises performed  Patient would benefit from continued PT intervention to address deficits and attain set goals  Plan: Continue per plan of care        Precautions: arthritis, hypertension, depression    Daily Treatment Diary    Date 3/1 3/8           FOTO IE            Re-Eval IE               Manuals    Cervical distraction   CC supine           Cervical/thoracic STM   CC supine/seated                                     Neuro Re-Ed                                                                                                Ther Ex    UT stretch 20"x2 20"x3 ea           LS stretch  20"x2 20"x3 ea           Seated cervical retraction   5"x10                                                                            Ther Activity    Arm bike   x3' rev                        Gait Training                              Modalities

## 2022-03-11 ENCOUNTER — OFFICE VISIT (OUTPATIENT)
Dept: PHYSICAL THERAPY | Facility: REHABILITATION | Age: 67
End: 2022-03-11
Payer: COMMERCIAL

## 2022-03-11 DIAGNOSIS — M54.2 CERVICALGIA: Primary | ICD-10-CM

## 2022-03-11 PROCEDURE — 97140 MANUAL THERAPY 1/> REGIONS: CPT | Performed by: PHYSICAL THERAPIST

## 2022-03-11 PROCEDURE — 97110 THERAPEUTIC EXERCISES: CPT | Performed by: PHYSICAL THERAPIST

## 2022-03-11 NOTE — PROGRESS NOTES
Daily Note     Today's date: 3/11/2022  Patient name: Bibi Merrill  : 1955  MRN: [id number]  Referring provider: Misty Ge  Dx:   Encounter Diagnosis     ICD-10-CM    1  Cervicalgia  M54 2                   Subjective: Pt comes to therapy reporting she had significant cerivcal soreness following the last therapy session two days ago  Attributes soreness to manuals performed  Objective: See treatment diary below      Assessment: Tolerated treatment well  Hypertonicity palpable in right > left UT/LS, however, eased post-manuals  Patient demonstrated fatigue post treatment, exhibited good technique with therapeutic exercises and would benefit from continued PT      Plan: Progress treatment as tolerated         Precautions: arthritis, hypertension, depression    Daily Treatment Diary    Date 3/1 3/8 3/11          FOTO IE            Re-Eval IE               Manuals    Cervical distraction   CC supine NIKKI          Cervical/thoracic STM   CC supine/seated NIKKI                                    Neuro Re-Ed                                                                                                Ther Ex    UT stretch 20"x2 20"x3 ea 30"x3          LS stretch  20"x2 20"x3 ea 30"x3          Seated cervical retraction   5"x10           Doorway pec str   30"x3                                                              Ther Activity    Arm bike   x3' rev 2'/2'                       Gait Training                              Modalities

## 2022-03-15 ENCOUNTER — OFFICE VISIT (OUTPATIENT)
Dept: PHYSICAL THERAPY | Facility: REHABILITATION | Age: 67
End: 2022-03-15
Payer: COMMERCIAL

## 2022-03-15 DIAGNOSIS — M54.2 CERVICALGIA: Primary | ICD-10-CM

## 2022-03-15 PROCEDURE — 97110 THERAPEUTIC EXERCISES: CPT | Performed by: PHYSICAL THERAPIST

## 2022-03-15 PROCEDURE — 97140 MANUAL THERAPY 1/> REGIONS: CPT | Performed by: PHYSICAL THERAPIST

## 2022-03-15 NOTE — PROGRESS NOTES
Daily Note     Today's date: 3/15/2022  Patient name: Gisselle Gutierrez  : 1955  MRN: [id number]  Referring provider: Elida Zavala  Dx:   Encounter Diagnosis     ICD-10-CM    1  Cervicalgia  M54 2                   Subjective: Pt states that she is feeling pretty good today  She reports that she did not feel as sore following last session  Still indicates that she feels very stiff throughout the day but is able to complete the HEP to relieve some symptoms  Objective: See treatment diary below      Assessment: Tolerated treatment well  Manual techniques were performed to address tight cervical musculature, and she reported that she felt 'pretty good' following treatment  Pt was also able to tolerate light strengthening activities to promote a more upright posture  Additional mid thoracic stretch was given to perform at home to address noted 'tightness' in thoracic and lumbar spine  Patient demonstrated fatigue post treatment and would benefit from continued PT      Plan: Continue per plan of care  Precautions: arthritis, hypertension, depression    Daily Treatment Diary    Date 3/1 3/8 3/11 3/15         FOTO IE            Re-Eval IE               Manuals    Cervical distraction   CC supine NIKKI JM         Cervical/thoracic STM   CC supine/seated NIKKI JM         Supine UTstretch    JM                      Neuro Re-Ed                                                                                                Ther Ex    UT stretch 20"x2 20"x3 ea 30"x3 30"x3         LS stretch  20"x2 20"x3 ea 30"x3 30"x3         Seated cervical retraction   5"x10  5"X10         Doorway pec str   30"x3 30"x3         Seated ball roll outs    10x fwd; 5x lat           Standing scap depression    15x c red ball                                   Ther Activity    Arm bike   x3' rev 2'/2' 2'/2'                      Gait Training                              Modalities                                  Treatment was performed by student physical therapist, Jere Sahu   Session was supervised by physical therapist, Quinton Buchanan

## 2022-03-22 ENCOUNTER — OFFICE VISIT (OUTPATIENT)
Dept: PHYSICAL THERAPY | Facility: REHABILITATION | Age: 67
End: 2022-03-22
Payer: COMMERCIAL

## 2022-03-22 DIAGNOSIS — M54.2 CERVICALGIA: Primary | ICD-10-CM

## 2022-03-22 PROCEDURE — 97140 MANUAL THERAPY 1/> REGIONS: CPT

## 2022-03-22 PROCEDURE — 97110 THERAPEUTIC EXERCISES: CPT

## 2022-03-22 NOTE — PROGRESS NOTES
Daily Note     Today's date: 3/22/2022  Patient name: Michael Landeros  : 1955  MRN: [id number]  Referring provider: Lakia Trevino  Dx:   Encounter Diagnosis     ICD-10-CM    1  Cervicalgia  M54 2                   Subjective:  Yoli Cerda reports that her neck is feeling much better  She notes having a fibro flare up last week and everything hurts when that happens but since there she has been doing her exercises and she is feeling good  Objective: See treatment diary below      Assessment: Patient tolerated treatment well  Patient performed ex and received manual therapy as noted  Provided cues as needed to ensure good ex technique and benefit  Reviewed deep breathing technique for relaxation and to assist with stress that contributes to UT over activation  Patient reported a positive response to treatment noting no pain post treatment  Plan: Continue per plan of care  Precautions: arthritis, hypertension, depression    Daily Treatment Diary    Date 3/1 3/8 3/11 3/15 3/22        FOTO IE    perf        Re-Eval IE               Manuals    Cervical distraction   CC supine NIKKI JM CC        Cervical/thoracic STM   CC supine/seated NIKKI JM CC        Supine UTstretch     CC                     Neuro Re-Ed                                                                                                Ther Ex    UT stretch 20"x2 20"x3 ea 30"x3 30"x3 30"x3        LS stretch  20"x2 20"x3 ea 30"x3 30"x3 30"x3        Seated cervical retraction   5"x10  5"X10 5"x10        Doorway pec str   30"x3 30"x3 30"x3        Seated ball roll outs    10x fwd; 5x lat   10x fwd  5x lat        Standing scap depression    15x c red ball 5" 3x5  Red ball                                  Ther Activity    Arm bike   x3' rev 2'/2' 2'/2' 1'1' alt  x4'                     Gait Training                              Modalities

## 2022-03-24 ENCOUNTER — OFFICE VISIT (OUTPATIENT)
Dept: PHYSICAL THERAPY | Facility: REHABILITATION | Age: 67
End: 2022-03-24
Payer: COMMERCIAL

## 2022-03-24 DIAGNOSIS — M54.2 CERVICALGIA: Primary | ICD-10-CM

## 2022-03-24 PROCEDURE — 97112 NEUROMUSCULAR REEDUCATION: CPT | Performed by: PHYSICAL THERAPIST

## 2022-03-24 PROCEDURE — 97110 THERAPEUTIC EXERCISES: CPT | Performed by: PHYSICAL THERAPIST

## 2022-03-24 PROCEDURE — 97140 MANUAL THERAPY 1/> REGIONS: CPT | Performed by: PHYSICAL THERAPIST

## 2022-03-24 NOTE — PROGRESS NOTES
Daily Note     Today's date: 3/24/2022  Patient name: Frankie Luu  : 1955  MRN: [id number]  Referring provider: Sammie Cuellar  Dx:   Encounter Diagnosis     ICD-10-CM    1  Cervicalgia  M54 2                   Subjective: Pt reports she finds the home exercises helpful at reducing her cervical stiffness  However, notes she finds she still has stiffness on a day-to-day basis  Objective: See treatment diary below      Assessment: Tolerated treatment well  Assessed patient's UE strength today, which demonstrated compensatory upper trap/scap elevation with resistance applied in flexion, abd, and IR  Initiated exercises today to address periscap and shoulder weakness, to avoid upper trap substitution  Educated patient to be mindful of this when performing ADLS  Patient demonstrated fatigue post treatment, exhibited good technique with therapeutic exercises and would benefit from continued PT      Plan: Progress treatment as tolerated  Precautions: arthritis, hypertension, depression    Daily Treatment Diary    Date 3/1 3/8 3/11 3/15 3/22 3/24       FOTO IE    perf        Re-Eval IE               Manuals    Cervical distraction   CC supine NIKKI  CC NIKKI       Cervical/thoracic STM   CC supine/seated [facility] CC NIKKI       Supine UTstretch     CC NIKKI                    Neuro Re-Ed     TB scap retract      Green 5"x15       TB shoulder ext      Green 5"x15       TB horiz abd      nv       TB ER @0*      nv       Seated scap depress      5"x10       Prone I, T b/l      3"x10 ea                    Ther Ex    UT stretch 20"x2 20"x3 ea 30"x3 30"x3 30"x3 30"x3       LS stretch  20"x2 20"x3 ea 30"x3 30"x3 30"x3 30"x3       Seated cervical retraction   5"x10  5"X10 5"x10        Doorway pec str   30"x3 30"x3 30"x3        Seated ball roll outs    10x fwd; 5x lat   10x fwd  5x lat        Standing scap depression    15x c red ball 5" 3x5  Red ball                                  Ther Activity    Arm bike   x3' rev 2'/2' 2'/2' 1'1' alt  x4' 1'1' alt  x4'                    Gait Training                              Modalities

## 2022-03-29 ENCOUNTER — OFFICE VISIT (OUTPATIENT)
Dept: PHYSICAL THERAPY | Facility: REHABILITATION | Age: 67
End: 2022-03-29
Payer: COMMERCIAL

## 2022-03-29 DIAGNOSIS — M54.2 CERVICALGIA: Primary | ICD-10-CM

## 2022-03-29 PROCEDURE — 97110 THERAPEUTIC EXERCISES: CPT | Performed by: PHYSICAL THERAPIST

## 2022-03-29 PROCEDURE — 97140 MANUAL THERAPY 1/> REGIONS: CPT | Performed by: PHYSICAL THERAPIST

## 2022-03-29 NOTE — PROGRESS NOTES
Daily Note     Today's date: 3/29/2022  Patient name: Andrew Lei  : 1955  MRN: [id number]  Referring provider: Annalise Hart  Dx:   Encounter Diagnosis     ICD-10-CM    1  Cervicalgia  M54 2                   Subjective: Pt comes to therapy reporting stiffness throughout body, attributed to cold weather  Objective: See treatment diary below      Assessment: Tolerated treatment well  Reported feeling loser by the end of the session  Patient exhibited good technique with therapeutic exercises and would benefit from continued PT      Plan: Progress treatment as tolerated  Precautions: arthritis, hypertension, depression    Daily Treatment Diary    Date 3/1 3/8 3/11 3/15 3/22 3/24 3/29      FOTO IE    perf        Re-Eval IE               Manuals    Cervical distraction   CC supine NIKKI JM CC NIKKI NIKKI      Cervical/thoracic STM   CC supine/seated NIKKI  CC NIKKI NIKKI      Supine UTstretch    JM CC NIKKI NIKKI                   Neuro Re-Ed     TB scap retract      Green 5"x15       TB shoulder ext      Green 5"x15       TB horiz abd      nv       TB ER @0*      nv       Seated scap depress      5"x10       Prone I, T b/l      3"x10 ea                    Ther Ex    UT stretch 20"x2 20"x3 ea 30"x3 30"x3 30"x3 30"x3 30"x3 ea      LS stretch  20"x2 20"x3 ea 30"x3 30"x3 30"x3 30"x3 30"x3 ea      Seated cervical retraction   5"x10  5"X10 5"x10        Doorway pec str   30"x3 30"x3 30"x3        Seated ball roll outs    10x fwd; 5x lat   10x fwd  5x lat        Standing scap depression    15x c red ball 5" 3x5  Red ball                                  Ther Activity    Arm bike   x3' rev 2'/2' 2'/2' 1'1' alt  x4' 1'1' alt  x4' 1'1' alt  x4'                   Gait Training                              Modalities

## 2022-03-31 ENCOUNTER — OFFICE VISIT (OUTPATIENT)
Dept: PHYSICAL THERAPY | Facility: REHABILITATION | Age: 67
End: 2022-03-31
Payer: COMMERCIAL

## 2022-03-31 DIAGNOSIS — M54.2 CERVICALGIA: Primary | ICD-10-CM

## 2022-03-31 PROCEDURE — 97110 THERAPEUTIC EXERCISES: CPT | Performed by: PHYSICAL THERAPIST

## 2022-03-31 PROCEDURE — 97140 MANUAL THERAPY 1/> REGIONS: CPT | Performed by: PHYSICAL THERAPIST

## 2022-03-31 NOTE — PROGRESS NOTES
Daily Note     Today's date: 3/31/2022  Patient name: Philomena Rincon  : 1955  MRN: [id number]  Referring provider: Roni Batres  Dx:   Encounter Diagnosis     ICD-10-CM    1  Cervicalgia  M54 2                   Subjective: Pt comes to therapy denying notable stiffness or pain  Objective: See treatment diary below      Assessment: Tolerated treatment well  Patient exhibited good technique with therapeutic exercises and would benefit from continued PT      Plan: Patient reports she would prefer to continue via HEP at present time and follow up PRN  To be placed on hold at present time  Precautions: arthritis, hypertension, depression    Daily Treatment Diary    Date 3/1 3/8 3/11 3/15 3/22 3/24 3/29 3/31     FOTO IE    perf        Re-Eval IE               Manuals    Cervical distraction   CC supine NIKKI JM CC NIKKI NIKKI      Cervical/thoracic STM   CC supine/seated NIKKI JM CC NIKKI NIKKI NIKKI     Supine UTstretch    JM CC NIKKI NIKKI                   Neuro Re-Ed     TB scap retract      Green 5"x15  Red 5" 2x10     TB shoulder ext      Green 5"x15  Red 5" 1x10     TB horiz abd      nv       TB ER @0*      nv  Green 5"x10     Seated scap depress      5"x10  5"x10     Prone I, T b/l      3"x10 ea                    Ther Ex    UT stretch 20"x2 20"x3 ea 30"x3 30"x3 30"x3 30"x3 30"x3 ea 30"x3 ea     LS stretch  20"x2 20"x3 ea 30"x3 30"x3 30"x3 30"x3 30"x3 ea 30"x3 ea     Seated cervical retraction   5"x10  5"X10 5"x10        Doorway pec str   30"x3 30"x3 30"x3        Seated ball roll outs    10x fwd; 5x lat   10x fwd  5x lat        Standing scap depression    15x c red ball 5" 3x5  Red ball                                  Ther Activity    Arm bike   x3' rev 2'/2' 2'/2' 1'1' alt  x4' 1'1' alt  x4' 1'1' alt  x4' 1'1' alt  x6'                  Gait Training                              Modalities

## 2022-08-09 DIAGNOSIS — F32.A DEPRESSION, UNSPECIFIED DEPRESSION TYPE: ICD-10-CM

## 2022-08-09 DIAGNOSIS — G47.00 INSOMNIA, UNSPECIFIED TYPE: ICD-10-CM

## 2022-08-09 DIAGNOSIS — I10 ESSENTIAL HYPERTENSION: ICD-10-CM

## 2022-08-09 RX ORDER — VENLAFAXINE HYDROCHLORIDE 150 MG/1
150 CAPSULE, EXTENDED RELEASE ORAL DAILY
Qty: 90 CAPSULE | Refills: 0 | Status: SHIPPED | OUTPATIENT
Start: 2022-08-09

## 2022-08-09 RX ORDER — LISINOPRIL 20 MG/1
20 TABLET ORAL DAILY
Qty: 90 TABLET | Refills: 0 | Status: SHIPPED | OUTPATIENT
Start: 2022-08-09

## 2022-08-09 RX ORDER — ZOLPIDEM TARTRATE 10 MG/1
10 TABLET ORAL
Qty: 90 TABLET | Refills: 0 | Status: SHIPPED | OUTPATIENT
Start: 2022-08-09

## 2022-08-09 NOTE — TELEPHONE ENCOUNTER
Protocol Fail   lisinopril (ZESTRIL) 20 mg tablet         Sig: Take 1 tablet (20 mg total) by mouth daily    Disp:  90 tablet    Refills:  0    Start: 8/9/2022    Class: Normal    Non-formulary For: Essential hypertension    Last ordered: 6 months ago by Aurelia Joshua DO     Cardiovascular:  ACE Inhibitors Failed 08/09/2022 12:26 PM   Protocol Details  Cr in normal range and within 360 days    K in normal range and within 360 days    BP completed in the last 6 months    Valid encounter within last 6 months    Pregnancy status within 30 days       venlafaxine (EFFEXOR-XR) 150 mg 24 hr capsule         Sig: Take 1 capsule (150 mg total) by mouth daily    Disp:  90 capsule    Refills:  0    Start: 8/9/2022    Class: Normal    Non-formulary For: Depression, unspecified depression type    Last ordered: 6 months ago by Aurelia Joshua DO     Psychiatry: Antidepressants - SNRI - desvenlafaxine & venlafaxine Failed 08/09/2022 12:26 PM   Protocol Details  Last BP in normal range and within 180 days    Valid encounter within last 6 months       zolpidem (AMBIEN) 10 mg tablet         Sig: Take 1 tablet (10 mg total) by mouth daily at bedtime as needed for sleep    Disp:  90 tablet    Refills:  0    Start: 8/9/2022    Class: Normal    Non-formulary For: Insomnia, unspecified type    Last ordered: 6 months ago by Aurelia Joshua DO     Psychiatry:  Anxiolytics/Hypnotics Failed 08/09/2022 12:26 PM   Protocol Details  This refill cannot be delegated    Valid encounter within last 6 months

## 2022-08-09 NOTE — TELEPHONE ENCOUNTER
Protocol fail   lisinopril (ZESTRIL) 20 mg tablet             Sig: Take 1 tablet (20 mg total) by mouth daily     Disp:  90 tablet    Refills:  0     Start: 8/9/2022     Class: Normal     Non-formulary For: Essential hypertension     Last ordered: 6 months ago by Jimy Coreas DO       Cardiovascular:  ACE Inhibitors Failed 08/09/2022 12:26 PM   Protocol Details  Cr in normal range and within 360 days    K in normal range and within 360 days    BP completed in the last 6 months    Valid encounter within last 6 months    Pregnancy status within 30 days       venlafaxine (EFFEXOR-XR) 150 mg 24 hr capsule             Sig: Take 1 capsule (150 mg total) by mouth daily     Disp:  90 capsule    Refills:  0     Start: 8/9/2022     Class: Normal     Non-formulary For: Depression, unspecified depression type     Last ordered: 6 months ago by Jimy Coreas DO       Psychiatry: Antidepressants - SNRI - desvenlafaxine & venlafaxine Failed 08/09/2022 12:26 PM   Protocol Details  Last BP in normal range and within 180 days    Valid encounter within last 6 months       zolpidem (AMBIEN) 10 mg tablet             Sig: Take 1 tablet (10 mg total) by mouth daily at bedtime as needed for sleep     Disp:  90 tablet    Refills:  0     Start: 8/9/2022     Class: Normal     Non-formulary For: Insomnia, unspecified type     Last ordered: 6 months ago by Jimy Coreas DO       Psychiatry:  Anxiolytics/Hypnotics Failed 08/09/2022 12:26 PM   Protocol Details  This refill cannot be delegated    Valid encounter within last 6 months

## 2022-11-02 DIAGNOSIS — G47.00 INSOMNIA, UNSPECIFIED TYPE: ICD-10-CM

## 2022-11-02 DIAGNOSIS — I10 ESSENTIAL HYPERTENSION: ICD-10-CM

## 2022-11-02 DIAGNOSIS — F32.A DEPRESSION, UNSPECIFIED DEPRESSION TYPE: ICD-10-CM

## 2022-11-02 RX ORDER — ZOLPIDEM TARTRATE 10 MG/1
10 TABLET ORAL
Qty: 90 TABLET | Refills: 0 | Status: SHIPPED | OUTPATIENT
Start: 2022-11-02

## 2022-11-02 RX ORDER — LISINOPRIL 20 MG/1
20 TABLET ORAL DAILY
Qty: 90 TABLET | Refills: 0 | Status: SHIPPED | OUTPATIENT
Start: 2022-11-02

## 2022-11-02 RX ORDER — VENLAFAXINE HYDROCHLORIDE 150 MG/1
150 CAPSULE, EXTENDED RELEASE ORAL DAILY
Qty: 90 CAPSULE | Refills: 0 | Status: SHIPPED | OUTPATIENT
Start: 2022-11-02

## 2022-11-02 NOTE — TELEPHONE ENCOUNTER
Prescriptions filled although no further refills until patient schedules a follow-up appointment and needs fasting labs  Please notify patient

## 2022-11-02 NOTE — TELEPHONE ENCOUNTER
Protocol Fail  lisinopril (ZESTRIL) 20 mg tablet          Sig: Take 1 tablet (20 mg total) by mouth daily    Disp:  90 tablet    Refills:  0    Start: 11/2/2022    Class: Normal    Non-formulary For: Essential hypertension    Last ordered: 2 months ago by Rebecca Prieto MD     Cardiovascular:  ACE Inhibitors Failed 11/02/2022 12:42 PM   Protocol Details  BP completed in the last 6 months    K in normal range and within 360 days    eGFR is 60 or above and within 360 days    Valid encounter within last 6 months    No hospital admission in the last 30 days    Pregnancy status within 30 days       venlafaxine (EFFEXOR-XR) 150 mg 24 hr capsule         Sig: Take 1 capsule (150 mg total) by mouth daily    Disp:  90 capsule    Refills:  0    Start: 11/2/2022    Class: Normal    Non-formulary For: Depression, unspecified depression type    Last ordered: 2 months ago by Rebecca Prieto MD     Psychiatry: Antidepressants - SNRI - desvenlafaxine & venlafaxine Failed 11/02/2022 12:42 PM   Protocol Details  Valid encounter within last 6 months    Last BP in normal range and within 180 days    No hospital admission in the last 30 days       zolpidem (AMBIEN) 10 mg tablet         Sig: Take 1 tablet (10 mg total) by mouth daily at bedtime as needed for sleep    Disp:  90 tablet    Refills:  0    Start: 11/2/2022    Class: Normal    Non-formulary For: Insomnia, unspecified type    Last ordered: 2 months ago by Rebecca Prieto MD     Psychiatry:  Anxiolytics/Hypnotics Failed 11/02/2022 12:42 PM   Protocol Details  This refill cannot be delegated    Valid encounter within last 6 months    No hospital admission in the last 30 days

## 2022-12-05 ENCOUNTER — OFFICE VISIT (OUTPATIENT)
Dept: FAMILY MEDICINE CLINIC | Facility: CLINIC | Age: 67
End: 2022-12-05

## 2022-12-05 VITALS
RESPIRATION RATE: 16 BRPM | BODY MASS INDEX: 30.08 KG/M2 | TEMPERATURE: 97.5 F | HEIGHT: 66 IN | WEIGHT: 187.2 LBS | HEART RATE: 88 BPM | SYSTOLIC BLOOD PRESSURE: 160 MMHG | DIASTOLIC BLOOD PRESSURE: 84 MMHG | OXYGEN SATURATION: 95 %

## 2022-12-05 DIAGNOSIS — I10 ESSENTIAL HYPERTENSION: ICD-10-CM

## 2022-12-05 DIAGNOSIS — Z12.31 ENCOUNTER FOR SCREENING MAMMOGRAM FOR MALIGNANT NEOPLASM OF BREAST: ICD-10-CM

## 2022-12-05 DIAGNOSIS — E78.2 MIXED HYPERLIPIDEMIA: ICD-10-CM

## 2022-12-05 DIAGNOSIS — F32.A DEPRESSION, UNSPECIFIED DEPRESSION TYPE: Primary | ICD-10-CM

## 2022-12-05 DIAGNOSIS — F41.9 ANXIETY: ICD-10-CM

## 2022-12-05 DIAGNOSIS — E55.9 VITAMIN D DEFICIENCY: ICD-10-CM

## 2022-12-05 DIAGNOSIS — E03.9 HYPOTHYROIDISM, UNSPECIFIED TYPE: ICD-10-CM

## 2022-12-05 DIAGNOSIS — G47.00 INSOMNIA, UNSPECIFIED TYPE: ICD-10-CM

## 2022-12-05 RX ORDER — LORAZEPAM 0.5 MG/1
0.5 TABLET ORAL EVERY 8 HOURS PRN
Qty: 90 TABLET | Refills: 0 | Status: SHIPPED | OUTPATIENT
Start: 2022-12-05

## 2022-12-05 NOTE — PROGRESS NOTES
Name: Bella Balderas      : 1955      MRN: [id number]  Encounter Provider: Nel Frost DO  Encounter Date: 2022   Encounter department: [address] [place] [address]    Patient declines flu vaccine  Patient given lab requisition for fasting labs as below  Patient to continue present treatment  Patient is being referred to Kristene Goltz, therapist for counseling  Return the office in 3 months  1  Depression, unspecified depression type  -     Ambulatory Referral to [facility]; Future    2  Anxiety  -     TSH, 3rd generation with Free T4 reflex; Future  -     Ambulatory Referral to [facility]; Future  -     LORazepam (ATIVAN) 0 5 mg tablet; Take 1 tablet (0 5 mg total) by mouth every 8 (eight) hours as needed for anxiety    3  Essential hypertension  -     CBC and Platelet; Future  -     Comprehensive metabolic panel; Future  -     TSH, 3rd generation with Free T4 reflex; Future  -     UA w Reflex to Microscopic w Reflex to Culture -Lab Collect; Future; Expected date: 2022    4  Hypothyroidism, unspecified type    5  Mixed hyperlipidemia  -     Comprehensive metabolic panel; Future  -     Lipid panel; Future    6  Insomnia, unspecified type    7  Vitamin D deficiency  -     Vitamin D 25 hydroxy; Future    8  Encounter for screening mammogram for malignant neoplasm of breast  -     Mammo screening bilateral w 3d & cad; Future; Expected date: 2022           Subjective      Patient complains of worsening anxiety and depression and requests referral for counseling  She requests refill on lorazepam which was helpful in the past     Anxiety  Presents for follow-up visit  Symptoms include decreased concentration, depressed mood, excessive worry, insomnia, irritability, nervous/anxious behavior, palpitations, panic and restlessness  Patient reports no chest pain, confusion, hyperventilation, shortness of breath or suicidal ideas   Symptoms occur most days  The severity of symptoms is interfering with daily activities and causing significant distress  The quality of sleep is good  Review of Systems   Constitutional: Positive for irritability  Respiratory: Negative for shortness of breath  Cardiovascular: Positive for palpitations  Negative for chest pain  Psychiatric/Behavioral: Positive for decreased concentration  Negative for confusion and suicidal ideas  The patient is nervous/anxious and has insomnia          Current Outpatient Medications on File Prior to Visit   Medication Sig   • albuterol (PROVENTIL HFA,VENTOLIN HFA) 90 mcg/act inhaler Inhale 2 puffs every 6 (six) hours as needed for wheezing   • butalbital-acetaminophen-caffeine (Esgic) -40 mg per tablet Take 1 tablet by mouth every 4 (four) hours as needed for headaches   • lisinopril (ZESTRIL) 20 mg tablet Take 1 tablet (20 mg total) by mouth daily   • LUMIGAN 0 01 % ophthalmic drops INSTILL 1 DROP IN THE LEFT EYE HS   • meloxicam (Mobic) 15 mg tablet Take 1 tablet (15 mg total) by mouth daily   • methocarbamol (ROBAXIN) 500 mg tablet Take 1 tablet (500 mg total) by mouth 2 (two) times a day as needed for muscle spasms   • venlafaxine (EFFEXOR-XR) 150 mg 24 hr capsule Take 1 capsule (150 mg total) by mouth daily   • zolpidem (AMBIEN) 10 mg tablet Take 1 tablet (10 mg total) by mouth daily at bedtime as needed for sleep   • [DISCONTINUED] LORazepam (ATIVAN) 0 5 mg tablet Take 1 tablet (0 5 mg total) by mouth every 8 (eight) hours as needed for anxiety   • [DISCONTINUED] methylPREDNISolone 4 MG tablet therapy pack Use as directed on package   • Docusate Sodium 100 MG capsule Take by mouth (Patient not taking: Reported on 2/7/2022 )   • triamcinolone (KENALOG) 0 5 % cream Apply topically as needed (eczema) (Patient not taking: Reported on 2/7/2022 )       Objective     /84   Pulse 88   Temp 97 5 °F (36 4 °C) (Temporal)   Resp 16   Ht 5' 6" (1 676 m)   Wt 84 9 kg (187 lb 3 2 oz)   SpO2 95%   BMI 30 21 kg/m²     Physical Exam  Constitutional:       General: She is not in acute distress  Appearance: Normal appearance  HENT:      Head: Normocephalic  Mouth/Throat:      Mouth: Mucous membranes are moist    Eyes:      General: No scleral icterus  Conjunctiva/sclera: Conjunctivae normal    Neck:      Vascular: No carotid bruit  Cardiovascular:      Rate and Rhythm: Normal rate and regular rhythm  Pulmonary:      Effort: Pulmonary effort is normal       Breath sounds: Normal breath sounds  Abdominal:      Palpations: Abdomen is soft  Tenderness: There is no abdominal tenderness  Musculoskeletal:      Cervical back: Neck supple  Right lower leg: No edema  Left lower leg: No edema  Lymphadenopathy:      Cervical: No cervical adenopathy  Skin:     General: Skin is warm and dry  Neurological:      General: No focal deficit present  Mental Status: She is alert and oriented to person, place, and time  Psychiatric:         Mood and Affect: Mood normal          Behavior: Behavior normal          Thought Content: Thought content normal          Judgment: Judgment normal        Hercules Decant, DO  BMI Counseling: Body mass index is 30 21 kg/m²  The BMI is above normal  Nutrition recommendations include decreasing overall calorie intake, 3-5 servings of fruits/vegetables daily, reducing fast food intake, consuming healthier snacks, decreasing soda and/or juice intake, moderation in carbohydrate intake, increasing intake of lean protein, reducing intake of saturated fat and trans fat and reducing intake of cholesterol  Exercise recommendations include moderate aerobic physical activity for 150 minutes/week

## 2022-12-07 ENCOUNTER — APPOINTMENT (OUTPATIENT)
Dept: LAB | Facility: IMAGING CENTER | Age: 67
End: 2022-12-07

## 2022-12-07 DIAGNOSIS — E55.9 VITAMIN D DEFICIENCY: ICD-10-CM

## 2022-12-07 DIAGNOSIS — E78.2 MIXED HYPERLIPIDEMIA: ICD-10-CM

## 2022-12-07 DIAGNOSIS — F41.9 ANXIETY: ICD-10-CM

## 2022-12-07 DIAGNOSIS — I10 ESSENTIAL HYPERTENSION: ICD-10-CM

## 2022-12-07 LAB
25(OH)D3 SERPL-MCNC: 23.1 NG/ML (ref 30–100)
ALBUMIN SERPL BCP-MCNC: 3.7 G/DL (ref 3.5–5)
ALP SERPL-CCNC: 84 U/L (ref 46–116)
ALT SERPL W P-5'-P-CCNC: 30 U/L (ref 12–78)
ANION GAP SERPL CALCULATED.3IONS-SCNC: 3 MMOL/L (ref 4–13)
AST SERPL W P-5'-P-CCNC: 23 U/L (ref 5–45)
BILIRUB SERPL-MCNC: 0.44 MG/DL (ref 0.2–1)
BUN SERPL-MCNC: 17 MG/DL (ref 5–25)
CALCIUM SERPL-MCNC: 8.9 MG/DL (ref 8.3–10.1)
CHLORIDE SERPL-SCNC: 103 MMOL/L (ref 96–108)
CHOLEST SERPL-MCNC: 206 MG/DL
CO2 SERPL-SCNC: 29 MMOL/L (ref 21–32)
CREAT SERPL-MCNC: 0.64 MG/DL (ref 0.6–1.3)
ERYTHROCYTE [DISTWIDTH] IN BLOOD BY AUTOMATED COUNT: 12.3 % (ref 11.6–15.1)
GFR SERPL CREATININE-BSD FRML MDRD: 91 ML/MIN/1.73SQ M
GLUCOSE P FAST SERPL-MCNC: 116 MG/DL (ref 65–99)
HCT VFR BLD AUTO: 42.8 % (ref 34.8–46.1)
HDLC SERPL-MCNC: 37 MG/DL
HGB BLD-MCNC: 14.2 G/DL (ref 11.5–15.4)
LDLC SERPL CALC-MCNC: 132 MG/DL (ref 0–100)
MCH RBC QN AUTO: 30.8 PG (ref 26.8–34.3)
MCHC RBC AUTO-ENTMCNC: 33.2 G/DL (ref 31.4–37.4)
MCV RBC AUTO: 93 FL (ref 82–98)
NONHDLC SERPL-MCNC: 169 MG/DL
PLATELET # BLD AUTO: 247 THOUSANDS/UL (ref 149–390)
PMV BLD AUTO: 10.6 FL (ref 8.9–12.7)
POTASSIUM SERPL-SCNC: 4.5 MMOL/L (ref 3.5–5.3)
PROT SERPL-MCNC: 6.6 G/DL (ref 6.4–8.4)
RBC # BLD AUTO: 4.61 MILLION/UL (ref 3.81–5.12)
SODIUM SERPL-SCNC: 135 MMOL/L (ref 135–147)
TRIGL SERPL-MCNC: 185 MG/DL
TSH SERPL DL<=0.05 MIU/L-ACNC: 0.84 UIU/ML (ref 0.45–4.5)
WBC # BLD AUTO: 6.42 THOUSAND/UL (ref 4.31–10.16)

## 2022-12-09 ENCOUNTER — TELEMEDICINE (OUTPATIENT)
Dept: FAMILY MEDICINE CLINIC | Facility: CLINIC | Age: 67
End: 2022-12-09

## 2022-12-09 DIAGNOSIS — R68.89 FLU-LIKE SYMPTOMS: Primary | ICD-10-CM

## 2022-12-09 NOTE — ASSESSMENT & PLAN NOTE
Acute symptomatic x3 days associated with fatigue diarrhea cough nasal congestion rhinorrhea decreased appetite  Denies shortness of breath chest pain and fever  Will recommend increase fluids, vitamin C, bland diet, COVID/flu PCR to lab  Educated with worsening of symptoms should report to ED

## 2022-12-09 NOTE — PROGRESS NOTES
COVID-19 Outpatient Progress Note    Assessment/Plan:    Problem List Items Addressed This Visit        Other    Flu-like symptoms - Primary     Acute symptomatic x3 days associated with fatigue diarrhea cough nasal congestion rhinorrhea decreased appetite  Denies shortness of breath chest pain and fever  Will recommend increase fluids, vitamin C, bland diet, COVID/flu PCR to lab  Educated with worsening of symptoms should report to ED  Relevant Orders    Covid/Flu- Office Collect      Disposition:     Recommended patient to come to the office to test for COVID-19/Influenza  Discussed symptom directed medication options with patient  Discussed vitamin D, vitamin C, and/or zinc supplementation with patient  I have spent 15 minutes directly with the patient  Greater than 50% of this time was spent in counseling/coordination of care regarding: instructions for management and patient and family education  Encounter provider: STEWART Umaña     Provider located at: [address] [place] [address] [phone]     Recent Visits  Date Type Provider Dept   12/05/22 Office Visit DO Marcell Krishnamurthy   Showing recent visits within past 7 days and meeting all other requirements  Today's Visits  Date Type Provider Dept   12/09/22 Telemedicine [place] [address], CRNP [facility]   Showing today's visits and meeting all other requirements  Future Appointments  No visits were found meeting these conditions  Showing future appointments within next 150 days and meeting all other requirements     This virtual check-in was done via telephone and she agrees to proceed  Patient agrees to participate in a virtual check in via telephone or video visit instead of presenting to the office to address urgent/immediate medical needs  Patient is aware this is a billable service   She acknowledged consent and understanding of privacy and security of the video platform  The patient has agreed to participate and understands they can discontinue the visit at any time  After connecting through Telephone, the patient was identified by name and date of birth  Sharon Dickey was informed that this was a telemedicine visit and that the exam was being conducted confidentially over secure lines  My office door was closed  No one else was in the room  Sharon Dickey acknowledged consent and understanding of privacy and security of the telemedicine visit  I informed the patient that I have reviewed her record in Epic and presented the opportunity for her to ask any questions regarding the visit today  The patient agreed to participate  It was my intent to perform this visit via video technology but the patient was not able to do a video connection so the visit was completed via audio telephone only  Verification of patient location:  Patient is located in the following state in which I hold an active license: [place]    Subjective:   Sharon Dickey is a 75 y o  female who is concerned about COVID-19  Patient's symptoms include fatigue, malaise, nasal congestion, rhinorrhea, cough, nausea and diarrhea  Patient denies fever, chills, sore throat, anosmia, loss of taste, shortness of breath, chest tightness, abdominal pain, vomiting, myalgias and headaches       - Date of symptom onset: 12/6/2022      COVID-19 vaccination status: Not vaccinated    Exposure:   Contact with a person who is under investigation (PUI) for or who is positive for COVID-19 within the last 14 days?: No    Hospitalized recently for fever and/or lower respiratory symptoms?: No      Currently a healthcare worker that is involved in direct patient care?: No      Works in a special setting where the risk of COVID-19 transmission may be high? (this may include long-term care, correctional and assisted facilities; homeless shelters; assisted-living facilities and group homes ): No      Resident in a special setting where the risk of COVID-19 transmission may be high? (this may include long-term care, correctional and nursing home facilities; homeless shelters; assisted-living facilities and group homes ): No      Lab Results   Component Value Date    SARSCOV2 Not Detected 05/28/2020       Review of Systems   Constitutional: Positive for activity change, appetite change and fatigue  Negative for chills and fever  HENT: Positive for congestion and rhinorrhea  Negative for sore throat  Respiratory: Positive for cough  Negative for chest tightness and shortness of breath  Gastrointestinal: Positive for diarrhea and nausea  Negative for abdominal pain and vomiting  Musculoskeletal: Negative for myalgias  Neurological: Negative for headaches       Current Outpatient Medications on File Prior to Visit   Medication Sig   • albuterol (PROVENTIL HFA,VENTOLIN HFA) 90 mcg/act inhaler Inhale 2 puffs every 6 (six) hours as needed for wheezing   • butalbital-acetaminophen-caffeine (Esgic) -40 mg per tablet Take 1 tablet by mouth every 4 (four) hours as needed for headaches   • Docusate Sodium 100 MG capsule Take by mouth (Patient not taking: Reported on 2/7/2022 )   • lisinopril (ZESTRIL) 20 mg tablet Take 1 tablet (20 mg total) by mouth daily   • LORazepam (ATIVAN) 0 5 mg tablet Take 1 tablet (0 5 mg total) by mouth every 8 (eight) hours as needed for anxiety   • LUMIGAN 0 01 % ophthalmic drops INSTILL 1 DROP IN THE LEFT EYE HS   • meloxicam (Mobic) 15 mg tablet Take 1 tablet (15 mg total) by mouth daily   • methocarbamol (ROBAXIN) 500 mg tablet Take 1 tablet (500 mg total) by mouth 2 (two) times a day as needed for muscle spasms   • triamcinolone (KENALOG) 0 5 % cream Apply topically as needed (eczema) (Patient not taking: Reported on 2/7/2022 )   • venlafaxine (EFFEXOR-XR) 150 mg 24 hr capsule Take 1 capsule (150 mg total) by mouth daily   • zolpidem (AMBIEN) 10 mg tablet Take 1 tablet (10 mg total) by mouth daily at bedtime as needed for sleep       Objective: There were no vitals taken for this visit       Physical Exam  Rahel Carbajal

## 2023-02-01 ENCOUNTER — TELEPHONE (OUTPATIENT)
Dept: PSYCHIATRY | Facility: CLINIC | Age: 68
End: 2023-02-01

## 2023-02-07 DIAGNOSIS — I10 ESSENTIAL HYPERTENSION: ICD-10-CM

## 2023-02-07 DIAGNOSIS — G47.00 INSOMNIA, UNSPECIFIED TYPE: ICD-10-CM

## 2023-02-07 RX ORDER — LISINOPRIL 20 MG/1
TABLET ORAL
Qty: 90 TABLET | Refills: 0 | Status: SHIPPED | OUTPATIENT
Start: 2023-02-07

## 2023-02-07 RX ORDER — ZOLPIDEM TARTRATE 10 MG/1
10 TABLET ORAL
Qty: 90 TABLET | Refills: 0 | Status: SHIPPED | OUTPATIENT
Start: 2023-02-07

## 2023-02-07 NOTE — TELEPHONE ENCOUNTER
Protocol Fail  zolpidem (AMBIEN) 10 mg tablet          Sig: Take 1 tablet (10 mg total) by mouth daily at bedtime as needed for sleep    Disp:  90 tablet    Refills:  0    Start: 2/7/2023    Class: Normal    Non-formulary For: Insomnia, unspecified type    Last ordered: 3 months ago by Myke Pavno DO     Psychiatry:  Anxiolytics/Hypnotics Failed 02/07/2023 09:28 AM   Protocol Details  This refill cannot be delegated    Valid encounter within last 6 months      To be filled at: [address], [address]

## 2023-03-09 DIAGNOSIS — F32.A DEPRESSION, UNSPECIFIED DEPRESSION TYPE: ICD-10-CM

## 2023-03-09 RX ORDER — VENLAFAXINE HYDROCHLORIDE 150 MG/1
150 CAPSULE, EXTENDED RELEASE ORAL DAILY
Qty: 90 CAPSULE | Refills: 0 | Status: SHIPPED | OUTPATIENT
Start: 2023-03-09

## 2023-03-09 NOTE — TELEPHONE ENCOUNTER
Failed protocol (called pt - 3 mo f/u scheduled for 03/15/23)    Psychiatry: Antidepressants - SNRI - desvenlafaxine & venlafaxine Failed 03/09/2023 03:29 PM    Last BP in normal range and within 180 days    Valid encounter within last 6 months

## 2023-03-14 ENCOUNTER — APPOINTMENT (OUTPATIENT)
Dept: RADIOLOGY | Age: 68
End: 2023-03-14

## 2023-03-14 ENCOUNTER — OFFICE VISIT (OUTPATIENT)
Dept: URGENT CARE | Age: 68
End: 2023-03-14

## 2023-03-14 VITALS
OXYGEN SATURATION: 99 % | TEMPERATURE: 96.8 F | SYSTOLIC BLOOD PRESSURE: 163 MMHG | RESPIRATION RATE: 18 BRPM | HEART RATE: 90 BPM | DIASTOLIC BLOOD PRESSURE: 85 MMHG

## 2023-03-14 DIAGNOSIS — S69.92XA INJURY OF LEFT HAND, INITIAL ENCOUNTER: ICD-10-CM

## 2023-03-14 DIAGNOSIS — M25.562 ACUTE PAIN OF LEFT KNEE: Primary | ICD-10-CM

## 2023-03-14 DIAGNOSIS — M25.562 ACUTE PAIN OF LEFT KNEE: ICD-10-CM

## 2023-03-14 NOTE — PATIENT INSTRUCTIONS
- X-rays of L 4th digit and L knee show no acute FXR  - Follow up with scheduled PCP visit tomorrow  - Continue applying Ice and taking Aleve  - Wear finger splint

## 2023-03-14 NOTE — PROGRESS NOTES
[facility]        NAME: Jose Pritchett is a 75 y o  female  : 1955    MRN: [id number]  DATE: 2023  TIME: 3:46 PM    Assessment and Plan   Acute pain of left knee [M25 562]  1  Acute pain of left knee  XR knee 4+ vw left injury      2  Injury of left hand, initial encounter  XR hand 3+ vw left      - X-rays of L 4th digit and L knee show no acute FXR  - Follow up with scheduled PCP visit tomorrow  - Continue applying Ice and taking Aleve  - Wear finger splint       Patient Instructions   - X-rays of L 4th digit and L knee show no acute FXR  - Follow up with scheduled PCP visit tomorrow  - Continue applying Ice and taking Aleve  - Wear finger splint     Follow up with PCP in 3-5 days  Proceed to  ER if symptoms worsen  Chief Complaint     Chief Complaint   Patient presents with   • Knee Pain     Pt c/o left knee pain  Pt twisted left knee about a month ago, woke up this morning and when pt was getting OOB felt a "pop"  Taking tylenol  Hx of arthritis  • Hand Pain     While getting OOB pt caught self and jammed ring finger into wall  Swollen, bruised  History of Present Illness       74 y/o F with PMHx of bilateral OA of the knees presents for left knee pain x 1 month  Pt admits today she was getting out of bed, with her feet flat on the floor, twisting to stand up, and heard and pop in her knee and was in immediate pain  While doing such, pt lost balance, reached out and jabbed her left 4th finger            Review of Systems   Review of Systems   Musculoskeletal:        4th digit - admits to pain, swelling, and bruising    Left knee - admits to pain; denies redness, swelling    Denies loss of sensation              Current Medications       Current Outpatient Medications:   •  albuterol (PROVENTIL HFA,VENTOLIN HFA) 90 mcg/act inhaler, Inhale 2 puffs every 6 (six) hours as needed for wheezing, Disp: 25 5 g, Rfl: 1  •  butalbital-acetaminophen-caffeine (Esgic) -40 mg per tablet, Take 1 tablet by mouth every 4 (four) hours as needed for headaches, Disp: 30 tablet, Rfl: 0  •  lisinopril (ZESTRIL) 20 mg tablet, TAKE 1 TABLET DAILY, Disp: 90 tablet, Rfl: 0  •  LORazepam (ATIVAN) 0 5 mg tablet, Take 1 tablet (0 5 mg total) by mouth every 8 (eight) hours as needed for anxiety, Disp: 90 tablet, Rfl: 0  •  LUMIGAN 0 01 % ophthalmic drops, INSTILL 1 DROP IN THE LEFT EYE HS, Disp: , Rfl:   •  meloxicam (Mobic) 15 mg tablet, Take 1 tablet (15 mg total) by mouth daily, Disp: 30 tablet, Rfl: 2  •  methocarbamol (ROBAXIN) 500 mg tablet, Take 1 tablet (500 mg total) by mouth 2 (two) times a day as needed for muscle spasms, Disp: 30 tablet, Rfl: 0  •  venlafaxine (EFFEXOR-XR) 150 mg 24 hr capsule, Take 1 capsule (150 mg total) by mouth daily, Disp: 90 capsule, Rfl: 0  •  zolpidem (AMBIEN) 10 mg tablet, Take 1 tablet (10 mg total) by mouth daily at bedtime as needed for sleep, Disp: 90 tablet, Rfl: 0  •  Docusate Sodium 100 MG capsule, Take by mouth (Patient not taking: Reported on 2/7/2022), Disp: , Rfl:   •  triamcinolone (KENALOG) 0 5 % cream, Apply topically as needed (eczema) (Patient not taking: Reported on 2/7/2022 ), Disp: 30 g, Rfl: 0    Current Allergies     Allergies as of 03/14/2023   • (No Known Allergies)            The following portions of the patient's history were reviewed and updated as appropriate: allergies, current medications, past family history, past medical history, past social history, past surgical history and problem list      Past Medical History:   Diagnosis Date   • Arthritis    • Asthma    • Depression     Last assessed 5/26/2017    • HL (hearing loss) 2015   • Hypertension     Last assessed 1/20/2016    • Pneumonia Years ago   • Thyroid disease    • Visual impairment Current       Past Surgical History:   Procedure Laterality Date   • BACK SURGERY      Spine repair    • BUNIONECTOMY Right    • COLONOSCOPY  2014   • HAND SURGERY     • NERVE BLOCK Left     Peripheral nerve block, wrist, median    • TUBAL LIGATION  1979       Family History   Problem Relation Age of Onset   • Lung cancer Mother    • Depression Mother    • Hypertension Mother    • Hyperlipidemia Mother    • Cancer Mother    • Glaucoma Mother    • Skin cancer Father    • Hyperlipidemia Father    • Diabetes Father    • No Known Problems Sister    • No Known Problems Maternal Grandmother    • No Known Problems Maternal Grandfather    • No Known Problems Paternal Grandmother    • No Known Problems Paternal Grandfather    • No Known Problems Maternal Aunt    • No Known Problems Maternal Aunt    • No Known Problems Maternal Aunt    • Breast cancer Paternal Aunt         Early 42's   • Breast cancer Paternal Aunt         early 42's   • Breast cancer Paternal Aunt         early 42's   • Heart disease Family    • Hypertension Family    • Diabetes Family    • Cancer Family    • Osteoarthritis Family    • Asthma Brother    • Drug abuse Brother          Medications have been verified  Objective   /85   Pulse 90   Temp (!) 96 8 °F (36 °C) (Tympanic)   Resp 18   SpO2 99%   No LMP recorded  Patient is postmenopausal        Physical Exam     Physical Exam  Musculoskeletal:      Comments: Left 4th digit - Echymosis and swelling present  Decreased ROM secondary to pain  Sensation intact  Radial pulse intact  No anatomic snuff box tenderness  Left knee - no lacerations, ecchymosis, erythema, edema, or skin changes  ROM not able to be accessed due to level of pain  Pain is located at medial joint line  PT pulse intact  Sensation intact

## 2023-03-15 ENCOUNTER — OFFICE VISIT (OUTPATIENT)
Dept: FAMILY MEDICINE CLINIC | Facility: CLINIC | Age: 68
End: 2023-03-15

## 2023-03-15 VITALS
DIASTOLIC BLOOD PRESSURE: 72 MMHG | RESPIRATION RATE: 16 BRPM | HEART RATE: 84 BPM | OXYGEN SATURATION: 94 % | TEMPERATURE: 96.7 F | BODY MASS INDEX: 29.57 KG/M2 | HEIGHT: 66 IN | WEIGHT: 184 LBS | SYSTOLIC BLOOD PRESSURE: 130 MMHG

## 2023-03-15 DIAGNOSIS — Z12.11 COLON CANCER SCREENING: ICD-10-CM

## 2023-03-15 DIAGNOSIS — I10 ESSENTIAL HYPERTENSION: Primary | ICD-10-CM

## 2023-03-15 DIAGNOSIS — E55.9 VITAMIN D DEFICIENCY: ICD-10-CM

## 2023-03-15 DIAGNOSIS — F32.A DEPRESSION, UNSPECIFIED DEPRESSION TYPE: ICD-10-CM

## 2023-03-15 DIAGNOSIS — F41.9 ANXIETY: ICD-10-CM

## 2023-03-15 DIAGNOSIS — L30.9 ECZEMA, UNSPECIFIED TYPE: ICD-10-CM

## 2023-03-15 DIAGNOSIS — E78.2 MIXED HYPERLIPIDEMIA: ICD-10-CM

## 2023-03-15 DIAGNOSIS — M17.12 PRIMARY LOCALIZED OSTEOARTHRITIS OF LEFT KNEE: ICD-10-CM

## 2023-03-15 DIAGNOSIS — G47.00 INSOMNIA, UNSPECIFIED TYPE: ICD-10-CM

## 2023-03-15 DIAGNOSIS — M54.6 ACUTE BILATERAL THORACIC BACK PAIN: ICD-10-CM

## 2023-03-15 RX ORDER — MELOXICAM 15 MG/1
15 TABLET ORAL DAILY
Qty: 30 TABLET | Refills: 2 | Status: SHIPPED | OUTPATIENT
Start: 2023-03-15

## 2023-03-15 RX ORDER — TRIAMCINOLONE ACETONIDE 5 MG/G
CREAM TOPICAL AS NEEDED
Qty: 30 G | Refills: 0 | Status: SHIPPED | OUTPATIENT
Start: 2023-03-15

## 2023-03-15 RX ORDER — MELATONIN
1000 DAILY
COMMUNITY

## 2023-03-15 NOTE — PROGRESS NOTES
Name: Talisha Bustamante      : 1955      MRN: [id number]  Encounter Provider: Adam Garcia DO  Encounter Date: 3/15/2023   Encounter department: [address] [place] [address]   Patient to continue present treatment and will restart meloxicam 15 mg 1 daily with food  Patient instructed to avoid ibuprofen and naproxen  She may continue ice, rest and leg elevation  Patient is being referred to [facility] orthopedics for further evaluation and treatment  Patient instructed to follow a low-fat, low-salt and a low carbohydrate diet and to get regular exercise walking as tolerated  Return to the office in 6 months  1  Essential hypertension    2  Mixed hyperlipidemia    3  Primary localized osteoarthritis of left knee  -     Ambulatory Referral to Orthopedic Surgery; Future    4  Anxiety    5  Depression, unspecified depression type    6  Insomnia, unspecified type    7  Vitamin D deficiency    8  Eczema, unspecified type  -     triamcinolone (KENALOG) 0 5 % cream; Apply topically as needed (eczema)    9  Acute bilateral thoracic back pain  -     meloxicam (Mobic) 15 mg tablet; Take 1 tablet (15 mg total) by mouth daily    10  Colon cancer screening  -     Cologuard           Subjective      Patient is here for follow-up appoint for chronic conditions and we reviewed fasting labs from last appointment  Patient has been feeling better overall although complains of recurrent left knee pain over the past 1 month  Patient states she twisted her knee at home while moving a small refrigerator on a hand truck  She admits to swelling and occasionally giving out sensation  Patient was seen at urgent care yesterday and had an x-ray which revealed osteoarthritis and an effusion  She has treated this with ibuprofen, ice, rest and elevation with some relief  Hypertension  This is a chronic problem  The problem is controlled  Associated symptoms include anxiety   Pertinent negatives include no blurred vision, chest pain, headaches, orthopnea, palpitations, peripheral edema, PND or shortness of breath  Risk factors for coronary artery disease include dyslipidemia, family history, smoking/tobacco exposure and post-menopausal state  Past treatments include ACE inhibitors  The current treatment provides significant improvement  Compliance problems include exercise  There is no history of CAD/MI or CVA  Knee Pain   The incident occurred more than 1 week ago  The incident occurred at home  The injury mechanism was a twisting injury  The pain is present in the left knee  The quality of the pain is described as aching  The pain has been intermittent since onset  Associated symptoms include a loss of motion  Pertinent negatives include no inability to bear weight, muscle weakness, numbness or tingling  The symptoms are aggravated by weight bearing and movement  She has tried ice, rest, heat, elevation and NSAIDs for the symptoms  The treatment provided mild relief  Review of Systems   Eyes: Negative for blurred vision  Respiratory: Negative for shortness of breath  Cardiovascular: Negative for chest pain, palpitations, orthopnea and PND  Neurological: Negative for tingling, numbness and headaches         Current Outpatient Medications on File Prior to Visit   Medication Sig   • albuterol (PROVENTIL HFA,VENTOLIN HFA) 90 mcg/act inhaler Inhale 2 puffs every 6 (six) hours as needed for wheezing   • butalbital-acetaminophen-caffeine (Esgic) -40 mg per tablet Take 1 tablet by mouth every 4 (four) hours as needed for headaches   • cholecalciferol (VITAMIN D3) 1,000 units tablet Take 1,000 Units by mouth daily   • lisinopril (ZESTRIL) 20 mg tablet TAKE 1 TABLET DAILY   • LORazepam (ATIVAN) 0 5 mg tablet Take 1 tablet (0 5 mg total) by mouth every 8 (eight) hours as needed for anxiety   • LUMIGAN 0 01 % ophthalmic drops INSTILL 1 DROP IN THE LEFT EYE HS   • methocarbamol (ROBAXIN) 500 mg tablet Take 1 tablet (500 mg total) by mouth 2 (two) times a day as needed for muscle spasms   • venlafaxine (EFFEXOR-XR) 150 mg 24 hr capsule Take 1 capsule (150 mg total) by mouth daily   • zolpidem (AMBIEN) 10 mg tablet Take 1 tablet (10 mg total) by mouth daily at bedtime as needed for sleep   • [DISCONTINUED] meloxicam (Mobic) 15 mg tablet Take 1 tablet (15 mg total) by mouth daily   • Docusate Sodium 100 MG capsule Take by mouth (Patient not taking: Reported on 2/7/2022)   • [DISCONTINUED] triamcinolone (KENALOG) 0 5 % cream Apply topically as needed (eczema) (Patient not taking: Reported on 2/7/2022)       Objective     /72 (BP Location: Left arm, Patient Position: Sitting, Cuff Size: Standard)   Pulse 84   Temp (!) 96 7 °F (35 9 °C) (Tympanic)   Resp 16   Ht 5' 6" (1 676 m)   Wt 83 5 kg (184 lb)   SpO2 94%   BMI 29 70 kg/m²     Physical Exam  Constitutional:       General: She is not in acute distress  Appearance: Normal appearance  HENT:      Head: Normocephalic  Mouth/Throat:      Mouth: Mucous membranes are moist    Eyes:      General: No scleral icterus  Conjunctiva/sclera: Conjunctivae normal    Neck:      Vascular: No carotid bruit  Cardiovascular:      Rate and Rhythm: Normal rate and regular rhythm  Pulmonary:      Effort: Pulmonary effort is normal       Breath sounds: Normal breath sounds  Abdominal:      Palpations: Abdomen is soft  Tenderness: There is no abdominal tenderness  Musculoskeletal:         General: Tenderness present  Cervical back: Neck supple  Right lower leg: No edema  Left lower leg: No edema  Comments: Left knee reveals decreased range of motion, mild effusion and medial joint line tenderness  Lymphadenopathy:      Cervical: No cervical adenopathy  Skin:     General: Skin is warm and dry  Neurological:      General: No focal deficit present  Mental Status: She is alert and oriented to person, place, and time  Psychiatric:         Mood and Affect: Mood normal          Behavior: Behavior normal          Thought Content:  Thought content normal          Judgment: Judgment normal        Vicki Santamaria, DO

## 2023-05-04 DIAGNOSIS — G47.00 INSOMNIA, UNSPECIFIED TYPE: ICD-10-CM

## 2023-05-04 RX ORDER — ZOLPIDEM TARTRATE 10 MG/1
10 TABLET ORAL
Qty: 90 TABLET | Refills: 0 | Status: SHIPPED | OUTPATIENT
Start: 2023-05-04

## 2023-06-22 ENCOUNTER — VBI (OUTPATIENT)
Dept: ADMINISTRATIVE | Facility: OTHER | Age: 68
End: 2023-06-22

## 2023-07-20 ENCOUNTER — VBI (OUTPATIENT)
Dept: ADMINISTRATIVE | Facility: OTHER | Age: 68
End: 2023-07-20

## 2023-08-03 DIAGNOSIS — G47.00 INSOMNIA, UNSPECIFIED TYPE: ICD-10-CM

## 2023-08-03 RX ORDER — ZOLPIDEM TARTRATE 10 MG/1
10 TABLET ORAL
Qty: 90 TABLET | Refills: 0 | Status: SHIPPED | OUTPATIENT
Start: 2023-08-03

## 2023-08-03 NOTE — TELEPHONE ENCOUNTER
zolpidem (AMBIEN) 10 mg tablet          Sig: Take 1 tablet (10 mg total) by mouth daily at bedtime as needed for sleep    Disp:  90 tablet    Refills:  0    Start: 8/3/2023    Class: Normal    Non-formulary For: Insomnia, unspecified type    Last ordered: 3 months ago (5/4/2023) by Delores Miller DO    Psychiatry:  [facility]ol[facility]notics Failed 08/03/2023 08:50 AM   Protocol Details  This refill cannot be delegated    Valid encounter within last 6 months

## 2023-09-19 ENCOUNTER — OFFICE VISIT (OUTPATIENT)
Dept: FAMILY MEDICINE CLINIC | Facility: CLINIC | Age: 68
End: 2023-09-19
Payer: COMMERCIAL

## 2023-09-19 VITALS
TEMPERATURE: 98 F | HEART RATE: 80 BPM | WEIGHT: 181.2 LBS | HEIGHT: 66 IN | RESPIRATION RATE: 16 BRPM | SYSTOLIC BLOOD PRESSURE: 138 MMHG | BODY MASS INDEX: 29.12 KG/M2 | OXYGEN SATURATION: 99 % | DIASTOLIC BLOOD PRESSURE: 78 MMHG

## 2023-09-19 DIAGNOSIS — Z13.820 SCREENING FOR OSTEOPOROSIS: ICD-10-CM

## 2023-09-19 DIAGNOSIS — E55.9 VITAMIN D DEFICIENCY: ICD-10-CM

## 2023-09-19 DIAGNOSIS — M54.6 ACUTE BILATERAL THORACIC BACK PAIN: ICD-10-CM

## 2023-09-19 DIAGNOSIS — F32.A DEPRESSION, UNSPECIFIED DEPRESSION TYPE: ICD-10-CM

## 2023-09-19 DIAGNOSIS — M19.90 ARTHRITIS: ICD-10-CM

## 2023-09-19 DIAGNOSIS — F41.9 ANXIETY: ICD-10-CM

## 2023-09-19 DIAGNOSIS — Z12.31 SCREENING MAMMOGRAM, ENCOUNTER FOR: ICD-10-CM

## 2023-09-19 DIAGNOSIS — I10 ESSENTIAL HYPERTENSION: Primary | ICD-10-CM

## 2023-09-19 DIAGNOSIS — E78.2 MIXED HYPERLIPIDEMIA: ICD-10-CM

## 2023-09-19 PROCEDURE — 99214 OFFICE O/P EST MOD 30 MIN: CPT | Performed by: FAMILY MEDICINE

## 2023-09-19 RX ORDER — VENLAFAXINE HYDROCHLORIDE 150 MG/1
150 CAPSULE, EXTENDED RELEASE ORAL DAILY
Qty: 90 CAPSULE | Refills: 3 | Status: SHIPPED | OUTPATIENT
Start: 2023-09-19

## 2023-09-19 RX ORDER — LORAZEPAM 0.5 MG/1
0.5 TABLET ORAL EVERY 8 HOURS PRN
Qty: 90 TABLET | Refills: 0 | Status: SHIPPED | OUTPATIENT
Start: 2023-09-19

## 2023-09-19 RX ORDER — LISINOPRIL 20 MG/1
20 TABLET ORAL DAILY
Qty: 90 TABLET | Refills: 3 | Status: SHIPPED | OUTPATIENT
Start: 2023-09-19

## 2023-09-19 RX ORDER — MELOXICAM 15 MG/1
15 TABLET ORAL DAILY
Qty: 30 TABLET | Refills: 2 | Status: SHIPPED | OUTPATIENT
Start: 2023-09-19

## 2023-09-19 NOTE — PROGRESS NOTES
Name: Shasta Muro      : 1955      MRN: [id number]  Encounter Provider: Raheel Keller DO  Encounter Date: 2023   Encounter department: [address] [place] [address]   Patient given requisition to schedule screening mammogram and DEXA bone scan. Patient to continue present treatment. Instructed to follow a low-fat, low-salt and a low carbohydrate diet and to get regular exercise walking as tolerated. Return to the office in 6 months. 1. Essential hypertension  -     lisinopril (ZESTRIL) 20 mg tablet; Take 1 tablet (20 mg total) by mouth daily    2. Mixed hyperlipidemia    3. Arthritis    4. Anxiety  -     LORazepam (ATIVAN) 0.5 mg tablet; Take 1 tablet (0.5 mg total) by mouth every 8 (eight) hours as needed for anxiety    5. Depression, unspecified depression type  -     venlafaxine (EFFEXOR-XR) 150 mg 24 hr capsule; Take 1 capsule (150 mg total) by mouth daily    6. Acute bilateral thoracic back pain  -     meloxicam (Mobic) 15 mg tablet; Take 1 tablet (15 mg total) by mouth daily    7. Vitamin D deficiency    8. Screening mammogram, encounter for  -     Mammo screening bilateral w 3d & cad; Future; Expected date: 2023    9. Screening for osteoporosis  -     DXA bone density spine hip and pelvis; Future; Expected date: 2023           Subjective      Patient is here for follow-up appoint for chronic conditions. Patient's been feeling fairly well overall except for ongoing arthritis pains. No regular exercise. Patient did not schedule mammogram as ordered last year although agrees to do so at this time. Also will schedule for DEXA bone scan. She declines vaccines. Hypertension  This is a chronic problem. The problem is controlled. Associated symptoms include anxiety and blurred vision. Pertinent negatives include no chest pain, headaches, orthopnea, palpitations, peripheral edema, PND or shortness of breath.  Risk factors for coronary artery disease include dyslipidemia, family history, obesity, post-menopausal state and smoking/tobacco exposure. Past treatments include ACE inhibitors. The current treatment provides significant improvement. Compliance problems include exercise. There is no history of CAD/MI or CVA. Review of Systems   Eyes: Positive for blurred vision. Respiratory: Negative for shortness of breath. Cardiovascular: Negative for chest pain, palpitations, orthopnea and PND. Neurological: Negative for headaches.        Current Outpatient Medications on File Prior to Visit   Medication Sig   • albuterol (PROVENTIL HFA,VENTOLIN HFA) 90 mcg/act inhaler Inhale 2 puffs every 6 (six) hours as needed for wheezing   • butalbital-acetaminophen-caffeine (Esgic) -40 mg per tablet Take 1 tablet by mouth every 4 (four) hours as needed for headaches   • cholecalciferol (VITAMIN D3) 1,000 units tablet Take 1,000 Units by mouth daily   • LUMIGAN 0.01 % ophthalmic drops INSTILL 1 DROP IN THE LEFT EYE HS   • methocarbamol (ROBAXIN) 500 mg tablet Take 1 tablet (500 mg total) by mouth 2 (two) times a day as needed for muscle spasms   • triamcinolone (KENALOG) 0.5 % cream Apply topically as needed (eczema)   • zolpidem (AMBIEN) 10 mg tablet Take 1 tablet (10 mg total) by mouth daily at bedtime as needed for sleep   • [DISCONTINUED] lisinopril (ZESTRIL) 20 mg tablet TAKE 1 TABLET DAILY   • [DISCONTINUED] LORazepam (ATIVAN) 0.5 mg tablet Take 1 tablet (0.5 mg total) by mouth every 8 (eight) hours as needed for anxiety   • [DISCONTINUED] meloxicam (Mobic) 15 mg tablet Take 1 tablet (15 mg total) by mouth daily   • [DISCONTINUED] venlafaxine (EFFEXOR-XR) 150 mg 24 hr capsule Take 1 capsule (150 mg total) by mouth daily   • Docusate Sodium 100 MG capsule Take by mouth (Patient not taking: Reported on 2/7/2022)       Objective     /78   Pulse 80   Temp 98 °F (36.7 °C)   Resp 16   Ht 5' 6" (1.676 m)   Wt 82.2 kg (181 lb 3.2 oz)   SpO2 99% BMI 29.25 kg/m²     Physical Exam  Constitutional:       General: She is not in acute distress. Appearance: Normal appearance. HENT:      Head: Normocephalic. Mouth/Throat:      Mouth: Mucous membranes are moist.   Eyes:      General: No scleral icterus. Conjunctiva/sclera: Conjunctivae normal.   Neck:      Vascular: No carotid bruit. Cardiovascular:      Rate and Rhythm: Normal rate and regular rhythm. Pulmonary:      Effort: Pulmonary effort is normal.      Breath sounds: Normal breath sounds. Abdominal:      Palpations: Abdomen is soft. Tenderness: There is no abdominal tenderness. Musculoskeletal:      Cervical back: Neck supple. Right lower leg: No edema. Left lower leg: No edema. Lymphadenopathy:      Cervical: No cervical adenopathy. Skin:     General: Skin is warm and dry. Neurological:      General: No focal deficit present. Mental Status: She is alert and oriented to person, place, and time. Psychiatric:         Mood and Affect: Mood normal.         Behavior: Behavior normal.         Thought Content:  Thought content normal.         Judgment: Judgment normal.       Jovanni Williamson,

## 2023-10-24 ENCOUNTER — HOSPITAL ENCOUNTER (OUTPATIENT)
Dept: RADIOLOGY | Facility: IMAGING CENTER | Age: 68
Discharge: HOME | End: 2023-10-24
Payer: COMMERCIAL

## 2023-10-24 VITALS — WEIGHT: 181 LBS | BODY MASS INDEX: 29.09 KG/M2 | HEIGHT: 66 IN

## 2023-10-24 DIAGNOSIS — Z12.31 SCREENING MAMMOGRAM, ENCOUNTER FOR: ICD-10-CM

## 2023-10-24 DIAGNOSIS — Z13.820 SCREENING FOR OSTEOPOROSIS: ICD-10-CM

## 2023-10-24 PROCEDURE — 77067 SCR MAMMO BI INCL CAD: CPT

## 2023-10-24 PROCEDURE — 77063 BREAST TOMOSYNTHESIS BI: CPT

## 2023-10-24 PROCEDURE — 77080 DXA BONE DENSITY AXIAL: CPT

## 2023-11-01 DIAGNOSIS — G47.00 INSOMNIA, UNSPECIFIED TYPE: ICD-10-CM

## 2023-11-02 RX ORDER — ZOLPIDEM TARTRATE 10 MG/1
10 TABLET ORAL
Qty: 90 TABLET | Refills: 0 | Status: SHIPPED | OUTPATIENT
Start: 2023-11-02

## 2023-11-02 NOTE — TELEPHONE ENCOUNTER
zolpidem (AMBIEN) 10 mg tablet          Sig: Take 1 tablet (10 mg total) by mouth daily at bedtime as needed for sleep    Disp: 90 tablet    Refills: 0    Start: 11/1/2023    Class: Normal    Non-formulary For: Insomnia, unspecified type    Last ordered: 3 months ago (8/3/2023) by Kelton Bustillo DO    Psychiatry:  Anxiolytics/Hypnotics [id number]/01/2023 03:49 PM   Protocol Details This refill cannot be delegated    Valid encounter within last 6 months      To be filled at: [id number]

## 2024-01-09 DIAGNOSIS — J20.9 BRONCHITIS, ACUTE, WITH BRONCHOSPASM: ICD-10-CM

## 2024-01-09 DIAGNOSIS — F32.A DEPRESSION, UNSPECIFIED DEPRESSION TYPE: ICD-10-CM

## 2024-01-09 RX ORDER — VENLAFAXINE HYDROCHLORIDE 150 MG/1
150 CAPSULE, EXTENDED RELEASE ORAL DAILY
Qty: 90 CAPSULE | Refills: 1 | Status: SHIPPED | OUTPATIENT
Start: 2024-01-09

## 2024-01-09 RX ORDER — ALBUTEROL SULFATE 90 UG/1
2 AEROSOL, METERED RESPIRATORY (INHALATION) EVERY 6 HOURS PRN
Qty: 25.5 G | Refills: 1 | Status: SHIPPED | OUTPATIENT
Start: 2024-01-09

## 2024-01-29 DIAGNOSIS — G47.00 INSOMNIA, UNSPECIFIED TYPE: ICD-10-CM

## 2024-01-29 RX ORDER — ZOLPIDEM TARTRATE 10 MG/1
10 TABLET ORAL
Qty: 90 TABLET | Refills: 0 | Status: SHIPPED | OUTPATIENT
Start: 2024-01-31

## 2024-01-29 NOTE — TELEPHONE ENCOUNTER
zolpidem (AMBIEN) 10 mg tablet         Sig: Take 1 tablet (10 mg total) by mouth daily at bedtime as needed for sleep    Disp: 90 tablet    Refills: 0    Start: 1/29/2024    Class: Normal    Non-formulary For: Insomnia, unspecified type    Last ordered: 2 months ago (11/2/2023) by Omar Chavez DO    Psychiatry:  Anxiolytics/Hypnotics [id number]/29/2024 01:33 PM   Protocol Details This refill cannot be delegated    Valid encounter within last 6 months      To be filled at: [facility] [place], [place] - [address] [place] [address]

## 2024-03-20 ENCOUNTER — OFFICE VISIT (OUTPATIENT)
Dept: FAMILY MEDICINE CLINIC | Facility: CLINIC | Age: 69
End: 2024-03-20
Payer: COMMERCIAL

## 2024-03-20 VITALS
HEIGHT: 66 IN | TEMPERATURE: 97.8 F | OXYGEN SATURATION: 96 % | RESPIRATION RATE: 16 BRPM | SYSTOLIC BLOOD PRESSURE: 135 MMHG | HEART RATE: 76 BPM | DIASTOLIC BLOOD PRESSURE: 62 MMHG | BODY MASS INDEX: 29.86 KG/M2 | WEIGHT: 185.8 LBS

## 2024-03-20 DIAGNOSIS — I10 ESSENTIAL HYPERTENSION: ICD-10-CM

## 2024-03-20 DIAGNOSIS — M18.0 PRIMARY OSTEOARTHRITIS OF BOTH FIRST CARPOMETACARPAL JOINTS: ICD-10-CM

## 2024-03-20 DIAGNOSIS — M54.6 ACUTE BILATERAL THORACIC BACK PAIN: ICD-10-CM

## 2024-03-20 DIAGNOSIS — M25.551 BILATERAL HIP PAIN: ICD-10-CM

## 2024-03-20 DIAGNOSIS — M19.90 ARTHRITIS: ICD-10-CM

## 2024-03-20 DIAGNOSIS — M25.552 BILATERAL HIP PAIN: ICD-10-CM

## 2024-03-20 DIAGNOSIS — Z11.59 NEED FOR HEPATITIS C SCREENING TEST: ICD-10-CM

## 2024-03-20 DIAGNOSIS — Z00.00 MEDICARE ANNUAL WELLNESS VISIT, SUBSEQUENT: Primary | ICD-10-CM

## 2024-03-20 DIAGNOSIS — M17.11 PRIMARY LOCALIZED OSTEOARTHRITIS OF RIGHT KNEE: ICD-10-CM

## 2024-03-20 DIAGNOSIS — E78.2 MIXED HYPERLIPIDEMIA: ICD-10-CM

## 2024-03-20 DIAGNOSIS — M17.12 PRIMARY LOCALIZED OSTEOARTHRITIS OF LEFT KNEE: ICD-10-CM

## 2024-03-20 DIAGNOSIS — E55.9 VITAMIN D DEFICIENCY: ICD-10-CM

## 2024-03-20 PROCEDURE — 99214 OFFICE O/P EST MOD 30 MIN: CPT | Performed by: FAMILY MEDICINE

## 2024-03-20 PROCEDURE — G0439 PPPS, SUBSEQ VISIT: HCPCS | Performed by: FAMILY MEDICINE

## 2024-03-20 RX ORDER — MELOXICAM 15 MG/1
15 TABLET ORAL DAILY
Qty: 30 TABLET | Refills: 2 | Status: SHIPPED | OUTPATIENT
Start: 2024-03-20

## 2024-03-20 NOTE — PATIENT INSTRUCTIONS
Medicare Preventive Visit Patient Instructions  Thank you for completing your Welcome to Medicare Visit or Medicare Annual Wellness Visit today. Your next wellness visit will be due in one year (3/21/2025).  The screening/preventive services that you may require over the next 5-10 years are detailed below. Some tests may not apply to you based off risk factors and/or age. Screening tests ordered at today's visit but not completed yet may show as past due. Also, please note that scanned in results may not display below.  Preventive Screenings:  Service Recommendations Previous Testing/Comments   Colorectal Cancer Screening  * Colonoscopy    * Fecal Occult Blood Test (FOBT)/Fecal Immunochemical Test (FIT)  * Fecal DNA/Cologuard Test  * Flexible Sigmoidoscopy Age: 45-75 years old   Colonoscopy: every 10 years (may be performed more frequently if at higher risk)  OR  FOBT/FIT: every 1 year  OR  Cologuard: every 3 years  OR  Sigmoidoscopy: every 5 years  Screening may be recommended earlier than age 45 if at higher risk for colorectal cancer. Also, an individualized decision between you and your healthcare provider will decide whether screening between the ages of 76-85 would be appropriate. Colonoscopy: 01/13/2011  FOBT/FIT: Not on file  Cologuard: Not on file  Sigmoidoscopy: Not on file          Breast Cancer Screening Age: 40+ years old  Frequency: every 1-2 years  Not required if history of left and right mastectomy Mammogram: 10/24/2023    Screening Current   Cervical Cancer Screening Between the ages of 21-29, pap smear recommended once every 3 years.   Between the ages of 30-65, can perform pap smear with HPV co-testing every 5 years.   Recommendations may differ for women with a history of total hysterectomy, cervical cancer, or abnormal pap smears in past. Pap Smear: Not on file    Screening Not Indicated   Hepatitis C Screening Once for adults born between 1945 and 1965  More frequently in patients at high risk  for Hepatitis C Hep C Antibody: Not on file        Diabetes Screening 1-2 times per year if you're at risk for diabetes or have pre-diabetes Fasting glucose: 116 mg/dL (12/7/2022)  A1C: No results in last 5 years (No results in last 5 years)      Cholesterol Screening Once every 5 years if you don't have a lipid disorder. May order more often based on risk factors. Lipid panel: 12/07/2022    Screening Not Indicated  History Lipid Disorder     Other Preventive Screenings Covered by Medicare:  Abdominal Aortic Aneurysm (AAA) Screening: covered once if your at risk. You're considered to be at risk if you have a family history of AAA.  Lung Cancer Screening: covers low dose CT scan once per year if you meet all of the following conditions: (1) Age 55-77; (2) No signs or symptoms of lung cancer; (3) Current smoker or have quit smoking within the last 15 years; (4) You have a tobacco smoking history of at least 20 pack years (packs per day multiplied by number of years you smoked); (5) You get a written order from a healthcare provider.  Glaucoma Screening: covered annually if you're considered high risk: (1) You have diabetes OR (2) Family history of glaucoma OR (3)  aged 50 and older OR (4)  American aged 65 and older  Osteoporosis Screening: covered every 2 years if you meet one of the following conditions: (1) You're estrogen deficient and at risk for osteoporosis based off medical history and other findings; (2) Have a vertebral abnormality; (3) On glucocorticoid therapy for more than 3 months; (4) Have primary hyperparathyroidism; (5) On osteoporosis medications and need to assess response to drug therapy.   Last bone density test (DXA Scan): 10/24/2023.  HIV Screening: covered annually if you're between the age of 15-65. Also covered annually if you are younger than 15 and older than 65 with risk factors for HIV infection. For pregnant patients, it is covered up to 3 times per  pregnancy.    Immunizations:  Immunization Recommendations   Influenza Vaccine Annual influenza vaccination during flu season is recommended for all persons aged >= 6 months who do not have contraindications   Pneumococcal Vaccine   * Pneumococcal conjugate vaccine = PCV13 (Prevnar 13), PCV15 (Vaxneuvance), PCV20 (Prevnar 20)  * Pneumococcal polysaccharide vaccine = PPSV23 (Pneumovax) Adults 19-63 yo with certain risk factors or if 65+ yo  If never received any pneumonia vaccine: recommend Prevnar 20 (PCV20)  Give PCV20 if previously received 1 dose of PCV13 or PPSV23   Hepatitis B Vaccine 3 dose series if at intermediate or high risk (ex: diabetes, end stage renal disease, liver disease)   Respiratory syncytial virus (RSV) Vaccine - COVERED BY MEDICARE PART D  * RSVPreF3 (Arexvy) CDC recommends that adults 60 years of age and older may receive a single dose of RSV vaccine using shared clinical decision-making (SCDM)   Tetanus (Td) Vaccine - COST NOT COVERED BY MEDICARE PART B Following completion of primary series, a booster dose should be given every 10 years to maintain immunity against tetanus. Td may also be given as tetanus wound prophylaxis.   Tdap Vaccine - COST NOT COVERED BY MEDICARE PART B Recommended at least once for all adults. For pregnant patients, recommended with each pregnancy.   Shingles Vaccine (Shingrix) - COST NOT COVERED BY MEDICARE PART B  2 shot series recommended in those 19 years and older who have or will have weakened immune systems or those 50 years and older     Health Maintenance Due:      Topic Date Due   • Hepatitis C Screening  Never done   • Colorectal Cancer Screening  01/13/2014   • Breast Cancer Screening: Mammogram  10/24/2024     Immunizations Due:      Topic Date Due   • Pneumococcal Vaccine: 65+ Years (2 of 2 - PPSV23 or PCV20) 09/21/2021   • Influenza Vaccine (1) 09/01/2023   • COVID-19 Vaccine (1 - 2023-24 season) Never done     Advance Directives   What are advance  directives?  Advance directives are legal documents that state your wishes and plans for medical care. These plans are made ahead of time in case you lose your ability to make decisions for yourself. Advance directives can apply to any medical decision, such as the treatments you want, and if you want to donate organs.   What are the types of advance directives?  There are many types of advance directives, and each state has rules about how to use them. You may choose a combination of any of the following:  Living will:  This is a written record of the treatment you want. You can also choose which treatments you do not want, which to limit, and which to stop at a certain time. This includes surgery, medicine, IV fluid, and tube feedings.   Durable power of  for healthcare (DPAHC):  This is a written record that states who you want to make healthcare choices for you when you are unable to make them for yourself. This person, called a proxy, is usually a family member or a friend. You may choose more than 1 proxy.  Do not resuscitate (DNR) order:  A DNR order is used in case your heart stops beating or you stop breathing. It is a request not to have certain forms of treatment, such as CPR. A DNR order may be included in other types of advance directives.  Medical directive:  This covers the care that you want if you are in a coma, near death, or unable to make decisions for yourself. You can list the treatments you want for each condition. Treatment may include pain medicine, surgery, blood transfusions, dialysis, IV or tube feedings, and a ventilator (breathing machine).  Values history:  This document has questions about your views, beliefs, and how you feel and think about life. This information can help others choose the care that you would choose.  Why are advance directives important?  An advance directive helps you control your care. Although spoken wishes may be used, it is better to have your wishes  written down. Spoken wishes can be misunderstood, or not followed. Treatments may be given even if you do not want them. An advance directive may make it easier for your family to make difficult choices about your care.   Urinary Incontinence   Urinary incontinence (UI)  is when you lose control of your bladder. UI develops because your bladder cannot store or empty urine properly. The 3 most common types of UI are stress incontinence, urge incontinence, or both.  Medicines:   May be given to help strengthen your bladder control. Report any side effects of medication to your healthcare provider.  Do pelvic muscle exercises often:  Your pelvic muscles help you stop urinating. Squeeze these muscles tight for 5 seconds, then relax for 5 seconds. Gradually work up to squeezing for 10 seconds. Do 3 sets of 15 repetitions a day, or as directed. This will help strengthen your pelvic muscles and improve bladder control.  Train your bladder:  Go to the bathroom at set times, such as every 2 hours, even if you do not feel the urge to go. You can also try to hold your urine when you feel the urge to go. For example, hold your urine for 5 minutes when you feel the urge to go. As that becomes easier, hold your urine for 10 minutes.   Self-care:   Keep a UI record.  Write down how often you leak urine and how much you leak. Make a note of what you were doing when you leaked urine.  Drink liquids as directed. You may need to limit the amount of liquid you drink to help control your urine leakage. Do not drink any liquid right before you go to bed. Limit or do not have drinks that contain caffeine or alcohol.   Prevent constipation.  Eat a variety of high-fiber foods. Good examples are high-fiber cereals, beans, vegetables, and whole-grain breads. Walking is the best way to trigger your intestines to have a bowel movement.  Exercise regularly and maintain a healthy weight.  Weight loss and exercise will decrease pressure on your  bladder and help you control your leakage.   Use a catheter as directed  to help empty your bladder. A catheter is a tiny, plastic tube that is put into your bladder to drain your urine.   Go to behavior therapy as directed.  Behavior therapy may be used to help you learn to control your urge to urinate.    Weight Management   Why it is important to manage your weight:  Being overweight increases your risk of health conditions such as heart disease, high blood pressure, type 2 diabetes, and certain types of cancer. It can also increase your risk for osteoarthritis, sleep apnea, and other respiratory problems. Aim for a slow, steady weight loss. Even a small amount of weight loss can lower your risk of health problems.  How to lose weight safely:  A safe and healthy way to lose weight is to eat fewer calories and get regular exercise. You can lose up about 1 pound a week by decreasing the number of calories you eat by 500 calories each day.   Healthy meal plan for weight management:  A healthy meal plan includes a variety of foods, contains fewer calories, and helps you stay healthy. A healthy meal plan includes the following:  Eat whole-grain foods more often.  A healthy meal plan should contain fiber. Fiber is the part of grains, fruits, and vegetables that is not broken down by your body. Whole-grain foods are healthy and provide extra fiber in your diet. Some examples of whole-grain foods are whole-wheat breads and pastas, oatmeal, brown rice, and bulgur.  Eat a variety of vegetables every day.  Include dark, leafy greens such as spinach, kale, arm[place] greens, and mustard greens. Eat yellow and orange vegetables such as carrots, sweet potatoes, and winter squash.   Eat a variety of fruits every day.  Choose fresh or canned fruit (canned in its own juice or light syrup) instead of juice. Fruit juice has very little or no fiber.  Eat low-fat dairy foods.  Drink fat-free (skim) milk or 1% milk. Eat fat-free yogurt  and low-fat cottage cheese. Try low-fat cheeses such as mozzarella and other reduced-fat cheeses.  Choose meat and other protein foods that are low in fat.  Choose beans or other legumes such as split peas or lentils. Choose fish, skinless poultry (chicken or turkey), or lean cuts of red meat (beef or pork). Before you cook meat or poultry, cut off any visible fat.   Use less fat and oil.  Try baking foods instead of frying them. Add less fat, such as margarine, sour cream, regular salad dressing and mayonnaise to foods. Eat fewer high-fat foods. Some examples of high-fat foods include french fries, doughnuts, ice cream, and cakes.  Eat fewer sweets.  Limit foods and drinks that are high in sugar. This includes candy, cookies, regular soda, and sweetened drinks.  Exercise:  Exercise at least 30 minutes per day on most days of the week. Some examples of exercise include walking, biking, dancing, and swimming. You can also fit in more physical activity by taking the stairs instead of the elevator or parking farther away from stores. Ask your healthcare provider about the best exercise plan for you.      © Copyright [employer] 2018 Information is for End User's use only and may not be sold, redistributed or otherwise used for commercial purposes. All illustrations and images included in CareNotes® are the copyrighted property of [employer] or [employer]

## 2024-03-20 NOTE — PROGRESS NOTES
Assessment/Plan:  Patient given lab requisition for fasting labs as below.  Patient to continue present treatment.  Recommend patient take meloxicam 15 mg 1 daily in the morning and Tylenol arthritis nightly as needed.  Patient to avoid ibuprofen.  Patient to follow a low-fat, low salt and a low carbohydrate diet and get regular aerobic exercise walking as tolerated.  Patient is being referred back to Dr. Loaiza, rheumatologist for further evaluation and treatment.  Return to the office 6 months.  No problem-specific Assessment & Plan notes found for this encounter.       Diagnoses and all orders for this visit:    Medicare annual wellness visit, subsequent    Essential hypertension  -     CBC; Future  -     Comprehensive metabolic panel; Future  -     TSH, 3rd generation with Free T4 reflex; Future  -     UA w Reflex to Microscopic w Reflex to Culture; Future    Mixed hyperlipidemia  -     Comprehensive metabolic panel; Future  -     Lipid Panel with Direct LDL reflex; Future    Bilateral hip pain  -     Ambulatory Referral to Rheumatology; Future    Primary localized osteoarthritis of right knee  -     Ambulatory Referral to Rheumatology; Future    Primary localized osteoarthritis of left knee  -     Ambulatory Referral to Rheumatology; Future    Primary osteoarthritis of both first carpometacarpal joints  -     Ambulatory Referral to Rheumatology; Future    Arthritis  -     Ambulatory Referral to Rheumatology; Future    Acute bilateral thoracic back pain  -     meloxicam (Mobic) 15 mg tablet; Take 1 tablet (15 mg total) by mouth daily    Vitamin D deficiency  -     Vitamin D 25 hydroxy; Future    Need for hepatitis C screening test  -     Hepatitis C Antibody; Future          Subjective:      Patient ID: Padmini Sharma is a 68 y.o. female.    Patient is here for annual medical wellness exam and follow-up appoint for chronic conditions.  She is due for fasting labs.  Patient complains of arthritic pains and  "requests referral back to Dr. Loaiza, rheumatologist.  She complains of bilateral hip pain and ongoing knee pains.  Patient declines colon cancer screening either colonoscopy or Cologuard.  She is up-to-date on mammogram and DEXA bone scan.    Hypertension  This is a chronic problem. The problem is controlled. Associated symptoms include anxiety. Pertinent negatives include no blurred vision, chest pain, headaches, orthopnea, palpitations, peripheral edema, PND or shortness of breath. Risk factors for coronary artery disease include dyslipidemia, family history, post-menopausal state and smoking/tobacco exposure. Past treatments include ACE inhibitors. The current treatment provides significant improvement. There are no compliance problems.  There is no history of CAD/MI or CVA.   Hip Pain   There was no injury mechanism. The pain is present in the right hip and left hip. The quality of the pain is described as aching. The pain has been Intermittent since onset. Pertinent negatives include no inability to bear weight, loss of motion, muscle weakness, numbness or tingling. The symptoms are aggravated by weight bearing. She has tried NSAIDs, heat, ice and rest for the symptoms. The treatment provided mild relief.       The following portions of the patient's history were reviewed and updated as appropriate: allergies, current medications, past family history, past medical history, past social history, past surgical history, and problem list.    Review of Systems   Eyes:  Negative for blurred vision.   Respiratory:  Negative for shortness of breath.    Cardiovascular:  Negative for chest pain, palpitations, orthopnea and PND.   Neurological:  Negative for tingling, numbness and headaches.         Objective:      /62 (BP Location: Left arm, Patient Position: Sitting, Cuff Size: Standard)   Pulse 76   Temp 97.8 °F (36.6 °C) (Tympanic)   Resp 16   Ht 5' 6\" (1.676 m)   Wt 84.3 kg (185 lb 12.8 oz)   SpO2 96%   " BMI 29.99 kg/m²          Physical Exam  Constitutional:       General: She is not in acute distress.     Appearance: Normal appearance.   HENT:      Head: Normocephalic.      Mouth/Throat:      Mouth: Mucous membranes are moist.   Eyes:      General: No scleral icterus.     Conjunctiva/sclera: Conjunctivae normal.   Neck:      Vascular: No carotid bruit.   Cardiovascular:      Rate and Rhythm: Normal rate and regular rhythm.   Pulmonary:      Effort: Pulmonary effort is normal.      Breath sounds: Normal breath sounds.   Abdominal:      Palpations: Abdomen is soft.      Tenderness: There is no abdominal tenderness.   Musculoskeletal:         General: Tenderness present.      Cervical back: Neck supple.      Right lower leg: No edema.      Left lower leg: No edema.      Comments: Bilateral hip GABBI tenderness and bilateral knee joint line tenderness.   Lymphadenopathy:      Cervical: No cervical adenopathy.   Skin:     General: Skin is warm and dry.   Neurological:      General: No focal deficit present.      Mental Status: She is alert and oriented to person, place, and time.   Psychiatric:         Mood and Affect: Mood normal.         Behavior: Behavior normal.         Thought Content: Thought content normal.         Judgment: Judgment normal.

## 2024-03-20 NOTE — PROGRESS NOTES
Assessment and Plan:     Problem List Items Addressed This Visit    None  Visit Diagnoses       Need for hepatitis C screening test                 Preventive health issues were discussed with patient, and age appropriate screening tests were ordered as noted in patient's After Visit Summary.  Personalized health advice and appropriate referrals for health education or preventive services given if needed, as noted in patient's After Visit Summary.     History of Present Illness:     Patient presents for a Medicare Wellness Visit    HPI   Patient Care Team:  Omar Chavez DO as PCP - General  Omar Chavez DO as PCP - PCP-[facility] ([facility]  MD Shreya Roman DO     Review of Systems:     Review of Systems     Problem List:     Patient Active Problem List   Diagnosis    Allergic rhinitis    Arthritis    Depression    Eczema    Neck pain    Glenohumeral arthritis, left    Hyperlipidemia    Essential hypertension    Hypothyroidism    Insomnia    Primary localized osteoarthritis of left knee    Primary localized osteoarthritis of right knee    Vitamin D deficiency    Anxiety    Osteoarthritis of carpometacarpal (CMC) joint of thumb    Acquired trigger finger    Carpal tunnel syndrome    Low back pain    Flu-like symptoms      Past Medical and Surgical History:     Past Medical History:   Diagnosis Date    Arthritis     Asthma     Depression     Last assessed 5/26/2017     HL (hearing loss) 2015    Hypertension     Last assessed 1/20/2016     Pneumonia Years ago    Thyroid disease     Visual impairment Current     Past Surgical History:   Procedure Laterality Date    BACK SURGERY      Spine repair     BUNIONECTOMY Right     COLONOSCOPY  2014    HAND SURGERY      NERVE BLOCK Left     Peripheral nerve block, wrist, median     TUBAL LIGATION  1979      Family History:     Family History   Problem Relation Age of Onset    Lung cancer Mother     Depression Mother     Hypertension  Mother     Hyperlipidemia Mother     Cancer Mother     Glaucoma Mother     Skin cancer Father     Hyperlipidemia Father     Diabetes Father     No Known Problems Sister     Breast cancer Maternal Grandmother     No Known Problems Maternal Grandfather     No Known Problems Paternal Grandmother     No Known Problems Paternal Grandfather     Asthma Brother     Drug abuse Brother     No Known Problems Brother     Lupus Son     Rheum arthritis Son     No Known Problems Son     No Known Problems Maternal Aunt     No Known Problems Maternal Aunt     No Known Problems Maternal Aunt     Breast cancer Paternal Aunt         Early 40's    Breast cancer Paternal Aunt         early 40's    Breast cancer Paternal Aunt         early 40's    Heart disease Family     Hypertension Family     Diabetes Family     Cancer Family     Osteoarthritis Family       Social History:     Social History     Socioeconomic History    Marital status:      Spouse name: None    Number of children: None    Years of education: None    Highest education level: None   Occupational History    None   Tobacco Use    Smoking status: Former     Current packs/day: 1.00     Average packs/day: 1 pack/day for 30.0 years (30.0 ttl pk-yrs)     Types: Cigarettes    Smokeless tobacco: Never   Vaping Use    Vaping status: Never Used   Substance and Sexual Activity    Alcohol use: Not Currently     Alcohol/week: 0.0 standard drinks of alcohol    Drug use: Never    Sexual activity: Not Currently   Other Topics Concern    None   Social History Narrative    None     Social Determinants of Health     Financial Resource Strain: Not on file   Food Insecurity: No Food Insecurity (3/19/2024)    Hunger Vital Sign     Worried About Running Out of Food in the Last Year: Never true     Ran Out of Food in the Last Year: Never true   Transportation Needs: No Transportation Needs (3/19/2024)    PRAPARE - Transportation     Lack of Transportation (Medical): No     Lack of  Transportation (Non-Medical): No   Physical Activity: Not on file   Stress: Not on file   Social Connections: Not on file   Intimate Partner Violence: Not on file   Housing Stability: Low Risk  (3/19/2024)    Housing Stability Vital Sign     Unable to Pay for Housing in the Last Year: No     Number of Places Lived in the Last Year: 1     Unstable Housing in the Last Year: No      Medications and Allergies:     Current Outpatient Medications   Medication Sig Dispense Refill    albuterol (PROVENTIL HFA,VENTOLIN HFA) 90 mcg/act inhaler Inhale 2 puffs every 6 (six) hours as needed for wheezing 25.5 g 1    butalbital-acetaminophen-caffeine (Esgic) -40 mg per tablet Take 1 tablet by mouth every 4 (four) hours as needed for headaches 30 tablet 0    cholecalciferol (VITAMIN D3) 1,000 units tablet Take 1,000 Units by mouth daily      lisinopril (ZESTRIL) 20 mg tablet Take 1 tablet (20 mg total) by mouth daily 90 tablet 3    LORazepam (ATIVAN) 0.5 mg tablet Take 1 tablet (0.5 mg total) by mouth every 8 (eight) hours as needed for anxiety 90 tablet 0    LUMIGAN 0.01 % ophthalmic drops INSTILL 1 DROP IN THE LEFT EYE HS      meloxicam (Mobic) 15 mg tablet Take 1 tablet (15 mg total) by mouth daily 30 tablet 2    methocarbamol (ROBAXIN) 500 mg tablet Take 1 tablet (500 mg total) by mouth 2 (two) times a day as needed for muscle spasms 30 tablet 0    triamcinolone (KENALOG) 0.5 % cream Apply topically as needed (eczema) 30 g 0    venlafaxine (EFFEXOR-XR) 150 mg 24 hr capsule Take 1 capsule (150 mg total) by mouth daily 90 capsule 1    zolpidem (AMBIEN) 10 mg tablet Take 1 tablet (10 mg total) by mouth daily at bedtime as needed for sleep Do not start before January 31, 2024. 90 tablet 0    Docusate Sodium 100 MG capsule Take by mouth (Patient not taking: Reported on 2/7/2022)       No current facility-administered medications for this visit.     No Known Allergies   Immunizations:     Immunization History   Administered  Date(s) Administered    INFLUENZA 09/21/2020    Influenza, seasonal, injectable 10/27/2017    Pneumococcal Conjugate 13-Valent 09/21/2020    Tuberculin Skin Test-PPD Intradermal 08/28/2017      Health Maintenance:         Topic Date Due    Hepatitis C Screening  Never done    Colorectal Cancer Screening  01/13/2014    Breast Cancer Screening: Mammogram  10/24/2024         Topic Date Due    Pneumococcal Vaccine: 65+ Years (2 of 2 - PPSV23 or PCV20) 09/21/2021    Influenza Vaccine (1) 09/01/2023    COVID-19 Vaccine (1 - 2023-24 season) Never done      Medicare Screening Tests and Risk Assessments:     Padmini is here for her Subsequent Wellness visit. Last Medicare Wellness visit information reviewed, patient interviewed and updates made to the record today.      Health Risk Assessment:   Patient rates overall health as good. Patient feels that their physical health rating is slightly worse. Patient is very satisfied with their life. Eyesight was rated as same. Hearing was rated as same. Patient feels that their emotional and mental health rating is much better. Patients states they are never, rarely angry. Patient states they are often unusually tired/fatigued. Pain experienced in the last 7 days has been a lot. Patient's pain rating has been 8/10. Patient states that she has experienced no weight loss or gain in last 6 months.     Depression Screening:   PHQ-9 Score: 2      Fall Risk Screening:   In the past year, patient has experienced: no history of falling in past year      Urinary Incontinence Screening:   Patient has leaked urine accidently in the last six months.     Home Safety:  Patient has trouble with stairs inside or outside of their home. Patient has working smoke alarms and has working carbon monoxide detector. Home safety hazards include: none.     Nutrition:   Current diet is Regular.     Medications:   Patient is currently taking over-the-counter supplements. OTC medications include: see medication  list. Patient is able to manage medications.     Activities of Daily Living (ADLs)/Instrumental Activities of Daily Living (IADLs):   Walk and transfer into and out of bed and chair?: Yes  Dress and groom yourself?: Yes    Bathe or shower yourself?: Yes    Feed yourself? Yes  Do your laundry/housekeeping?: Yes  Manage your money, pay your bills and track your expenses?: Yes  Make your own meals?: Yes    Do your own shopping?: Yes    Previous Hospitalizations:   Any hospitalizations or ED visits within the last 12 months?: No      Advance Care Planning:   Living will: No    Durable POA for healthcare: No    Advanced directive: No    Advanced directive counseling given: Yes      Cognitive Screening:   Provider or family/friend/caregiver concerned regarding cognition?: No    PREVENTIVE SCREENINGS      Cardiovascular Screening:    General: Screening Not Indicated, History Lipid Disorder and Risks and Benefits Discussed    Due for: Lipid Panel      Diabetes Screening:     General: Risks and Benefits Discussed    Due for: Blood Glucose      Colorectal Cancer Screening:     General: Patient Declines      Breast Cancer Screening:     General: Screening Current and Risks and Benefits Discussed      Cervical Cancer Screening:    General: Screening Not Indicated and Risks and Benefits Discussed      Osteoporosis Screening:    General: Risks and Benefits Discussed and Screening Current      Abdominal Aortic Aneurysm (AAA) Screening:        General: Risks and Benefits Discussed and Screening Not Indicated      Lung Cancer Screening:     General: Risks and Benefits Discussed and Screening Not Indicated      Hepatitis C Screening:    General: Risks and Benefits Discussed    Hep C Screening Accepted: Yes      Screening, Brief Intervention, and Referral to Treatment (SBIRT)    Screening  Typical number of drinks in a day: 0  Typical number of drinks in a week: 0  Interpretation: Low risk drinking behavior.    Single Item Drug  "Screening:  How often have you used an illegal drug (including marijuana) or a prescription medication for non-medical reasons in the past year? never    Single Item Drug Screen Score: 0  Interpretation: Negative screen for possible drug use disorder    Brief Intervention  Alcohol & drug use screenings were reviewed. No concerns regarding substance use disorder identified.     Other Counseling Topics:   Car/seat belt/driving safety, skin self-exam, sunscreen and calcium and vitamin D intake and regular weightbearing exercise.     No results found.     Physical Exam:     /62 (BP Location: Left arm, Patient Position: Sitting, Cuff Size: Standard)   Pulse 87   Temp 97.8 °F (36.6 °C) (Tympanic)   Ht 5' 6\" (1.676 m)   Wt 84.3 kg (185 lb 12.8 oz)   SpO2 96%   BMI 29.99 kg/m²     Physical Exam     Omar Chavez, DO  "

## 2024-04-29 DIAGNOSIS — G47.00 INSOMNIA, UNSPECIFIED TYPE: ICD-10-CM

## 2024-04-29 RX ORDER — ZOLPIDEM TARTRATE 10 MG/1
10 TABLET ORAL
Qty: 90 TABLET | Refills: 0 | Status: SHIPPED | OUTPATIENT
Start: 2024-04-29

## 2024-06-21 ENCOUNTER — CONSULT (OUTPATIENT)
Dept: RHEUMATOLOGY | Facility: CLINIC | Age: 69
End: 2024-06-21
Payer: COMMERCIAL

## 2024-06-21 VITALS
HEIGHT: 66 IN | BODY MASS INDEX: 28.77 KG/M2 | SYSTOLIC BLOOD PRESSURE: 128 MMHG | OXYGEN SATURATION: 98 % | DIASTOLIC BLOOD PRESSURE: 86 MMHG | HEART RATE: 93 BPM | WEIGHT: 179 LBS

## 2024-06-21 DIAGNOSIS — M17.12 PRIMARY LOCALIZED OSTEOARTHRITIS OF LEFT KNEE: ICD-10-CM

## 2024-06-21 DIAGNOSIS — M17.11 PRIMARY LOCALIZED OSTEOARTHRITIS OF RIGHT KNEE: ICD-10-CM

## 2024-06-21 DIAGNOSIS — M19.90 ARTHRITIS: ICD-10-CM

## 2024-06-21 DIAGNOSIS — M25.551 BILATERAL HIP PAIN: ICD-10-CM

## 2024-06-21 DIAGNOSIS — M18.0 PRIMARY OSTEOARTHRITIS OF BOTH FIRST CARPOMETACARPAL JOINTS: ICD-10-CM

## 2024-06-21 DIAGNOSIS — M25.552 BILATERAL HIP PAIN: ICD-10-CM

## 2024-06-21 PROCEDURE — 99205 OFFICE O/P NEW HI 60 MIN: CPT | Performed by: INTERNAL MEDICINE

## 2024-06-21 NOTE — PROGRESS NOTES
"  This is a Rheumatology Consult seen at the request of Dr. Chavez       HPI: Viviana Sharma is 69 y/o female who presents for further evaluation rheumatoid arthritis. She has past medical history Juvenile arthritis, HTN, DDD, fibromyalgia    She was told \"in my childhood I have juvenile arthritis\". Subsequently told she has fibromyalgia    For the past 5-6 years she has has chronic arthralgias    Low back, hips, hands, feet and knees    Chronic LBP, DDD radiculopathy s/p lumbar surgery. Has seen pain management and had injections in the past     W/u WES, RF negative    She was Rxd Mobic but did not tolerate due to gastritis. She tolerates tylenol better    --------------------------------------------------------------------------------------------------------        ROS:      - for: Fevers, Chills or sweats.  No HAs or scalp tenderness.  No jaw claudication.  No acute visual or eye changes.  No dry eyes.  No auditory complaints.  No oral lesions or ulcers.  No dry mouth.  No sore throat or cough.  No chest pains or palpitations.  No shortness of breath, dyspnea on exertion or wheezing.  No hemotpysis.  No abdominal pain, GERD symptoms, diarrhea or constipation.  No urinary complaints.  No numbness, tingling or weakness.  No rashes.    All other ROS was reviewed and negative except as above         --------------------------------------------------------------------------------------------------------    Past Medical History    Past Medical History:   Diagnosis Date    Arthritis     Asthma     Depression     Last assessed 5/26/2017     HL (hearing loss) 2015    Hypertension     Last assessed 1/20/2016     Pneumonia Years ago    Thyroid disease     Visual impairment Current           Past Surgical History    Past Surgical History:   Procedure Laterality Date    BACK SURGERY      Spine repair     BUNIONECTOMY Right     COLONOSCOPY  2014    HAND SURGERY      NERVE BLOCK Left     Peripheral nerve block, wrist, median  "    TUBAL LIGATION  1979           Family History    Family History   Problem Relation Age of Onset    Lung cancer Mother     Depression Mother     Hypertension Mother     Hyperlipidemia Mother     Cancer Mother     Glaucoma Mother     Skin cancer Father     Hyperlipidemia Father     Diabetes Father     No Known Problems Sister     Breast cancer Maternal Grandmother     No Known Problems Maternal Grandfather     No Known Problems Paternal Grandmother     No Known Problems Paternal Grandfather     Asthma Brother     Drug abuse Brother     No Known Problems Brother     Lupus Son     Rheum arthritis Son     No Known Problems Son     No Known Problems Maternal Aunt     No Known Problems Maternal Aunt     No Known Problems Maternal Aunt     Breast cancer Paternal Aunt         Early 40's    Breast cancer Paternal Aunt         early 40's    Breast cancer Paternal Aunt         early 40's    Heart disease Family     Hypertension Family     Diabetes Family     Cancer Family     Osteoarthritis Family             Social History    Social History     Tobacco Use    Smoking status: Former     Current packs/day: 1.00     Average packs/day: 1 pack/day for 30.0 years (30.0 ttl pk-yrs)     Types: Cigarettes    Smokeless tobacco: Never   Vaping Use    Vaping status: Never Used   Substance Use Topics    Alcohol use: Not Currently     Alcohol/week: 0.0 standard drinks of alcohol    Drug use: Never         Allergies    No Known Allergies      Medications    Current Outpatient Medications   Medication Instructions    albuterol (PROVENTIL HFA,VENTOLIN HFA) 90 mcg/act inhaler 2 puffs, Inhalation, Every 6 hours PRN    butalbital-acetaminophen-caffeine (Esgic) -40 mg per tablet 1 tablet, Oral, Every 4 hours PRN    cholecalciferol (VITAMIN D3) 1,000 Units, Oral, Daily    Docusate Sodium 100 MG capsule Take by mouth    lisinopril (ZESTRIL) 20 mg, Oral, Daily    LORazepam (ATIVAN) 0.5 mg, Oral, Every 8 hours PRN    LUMIGAN 0.01 % ophthalmic  "drops INSTILL 1 DROP IN THE LEFT EYE HS    meloxicam (MOBIC) 15 mg, Oral, Daily    methocarbamol (ROBAXIN) 500 mg, Oral, 2 times daily PRN    triamcinolone (KENALOG) 0.5 % cream Topical, As needed    venlafaxine (EFFEXOR-XR) 150 mg, Oral, Daily    zolpidem (AMBIEN) 10 mg, Oral, Daily at bedtime PRN          Physical Exam    /86   Pulse 93   Ht 5' 6\" (1.676 m)   Wt 81.2 kg (179 lb)   SpO2 98%   BMI 28.89 kg/m²     GEN: AAO, No apparent distress.  Patient is well developed.  HEENT:  Pupils are equal, round and reactive.  Sclera are clear.  Fundoscopic exam is normal.  External ears are without lesions.  Oral pharynx is clear of ulcers or other lesions.  MMM.   NECK:  Supple.  There is no adenopathy appreciable in anterior or posterior cervical chains or supraclavicularly.  JVP is normal.    HEART: Regular rate and rhythm.  There is no appreciable murmur, gallop or rub.  LUNGS: Clear to auscultation.  ABD:  Soft, without tenderness, rebound or guarding.  No appreciable organomegally.  NEURO: Speech and cognition are normal.  Strength is 5/5 throughout.  Tone is normal.  DTRs are 2/4 at the knees, ankles and elbows.  Gait is normal.  SKIN: There are no rashes or lesions    MUSCULOSKELETAL:   + cornel OA      ________________________________________________________________________          Results Review    Component      Latest Ref Rn 2/15/2022   ANTI-NUCLEAR ANTIBODY (WES)      Negative  Negative    RHEUMATOID FACTOR      Negative  Negative    Sed Rate      0 - 29 mm/hour 18    C-REACTIVE PROTEIN      <3.0 mg/L <3.0            VIEWS:  XR HAND 3+ VW LEFT         For the purposes of institution wide universal language the following terms will apply: (thumb=1st digit/finger, index finger=2nd digit/finger, long finger=3rd digit/finger, ring=4th digit/finger and small finger=5th digit/finger)     FINDINGS:     There is no acute fracture or dislocation.     Osteoarthritis of the DIP and PIP joints.  Osteoarthritis " of the 1st carpometacarpal joint.     No lytic or blastic osseous lesion.     Soft tissues are unremarkable.     IMPRESSION:     No acute osseous abnormality.     Degenerative changes as described.          Narrative & Impression   LEFT KNEE     INDICATION:   M25.562: Pain in left knee.     COMPARISON:  Plain films April 2017     VIEWS:  XR KNEE 4+ VW LEFT INJURY         FINDINGS:     There is no acute fracture or dislocation.     There is a small joint effusion.     Moderate tricompartmental osteoarthritis as evidenced by joint space narrowing, osteophyte formation and subchondral sclerosis.     No lytic or blastic osseous lesion.     Soft tissues are unremarkable.     IMPRESSION:     Moderate osteoarthritis and small joint effusion.         Impressions     Osteoarthritis  DDD lumbar spine  OA hands  OA knees  OA hips  Carpal tunnel syndrome      Plans:    Viviana Sharma is a 67 y/o female with clinical s/s osteoarthritis with classical cornel OA hands    I have reviewed OA and provided handouts    OTC NSAIDs/Tylenol as needed    Discussed orthopedic referral for hip pain (may need hip injections) declines for now    I have offered knee injections as needed    Repeat serologies    Continue to monitor for inflammatory arthritis    RTC 6 months        Thank you for involving me in this patient's care.        Genaro Packer MD  [facility]      I have spent a total time of 63 minutes on 06/21/24 in caring for this patient including Diagnostic results, Counseling / Coordination of care, Documenting in the medical record, Reviewing / ordering tests, medicine, procedures  , and Obtaining or reviewing history  .

## 2024-06-27 ENCOUNTER — APPOINTMENT (OUTPATIENT)
Dept: LAB | Facility: IMAGING CENTER | Age: 69
End: 2024-06-27
Payer: COMMERCIAL

## 2024-06-27 DIAGNOSIS — M19.90 ARTHRITIS: ICD-10-CM

## 2024-06-27 DIAGNOSIS — M17.11 PRIMARY LOCALIZED OSTEOARTHRITIS OF RIGHT KNEE: ICD-10-CM

## 2024-06-27 DIAGNOSIS — M17.12 PRIMARY LOCALIZED OSTEOARTHRITIS OF LEFT KNEE: ICD-10-CM

## 2024-06-27 DIAGNOSIS — M18.0 PRIMARY OSTEOARTHRITIS OF BOTH FIRST CARPOMETACARPAL JOINTS: ICD-10-CM

## 2024-06-27 DIAGNOSIS — M25.551 BILATERAL HIP PAIN: ICD-10-CM

## 2024-06-27 DIAGNOSIS — E55.9 VITAMIN D DEFICIENCY: ICD-10-CM

## 2024-06-27 DIAGNOSIS — M25.552 BILATERAL HIP PAIN: ICD-10-CM

## 2024-06-27 DIAGNOSIS — E78.2 MIXED HYPERLIPIDEMIA: ICD-10-CM

## 2024-06-27 DIAGNOSIS — I10 ESSENTIAL HYPERTENSION: ICD-10-CM

## 2024-06-27 DIAGNOSIS — Z11.59 NEED FOR HEPATITIS C SCREENING TEST: ICD-10-CM

## 2024-06-27 LAB
25(OH)D3 SERPL-MCNC: 21.3 NG/ML (ref 30–100)
ALBUMIN SERPL BCG-MCNC: 4.4 G/DL (ref 3.5–5)
ALP SERPL-CCNC: 74 U/L (ref 34–104)
ALT SERPL W P-5'-P-CCNC: 12 U/L (ref 7–52)
ANION GAP SERPL CALCULATED.3IONS-SCNC: 10 MMOL/L (ref 4–13)
AST SERPL W P-5'-P-CCNC: 14 U/L (ref 13–39)
BILIRUB SERPL-MCNC: 0.45 MG/DL (ref 0.2–1)
BUN SERPL-MCNC: 21 MG/DL (ref 5–25)
CALCIUM SERPL-MCNC: 9.6 MG/DL (ref 8.4–10.2)
CHLORIDE SERPL-SCNC: 102 MMOL/L (ref 96–108)
CHOLEST SERPL-MCNC: 298 MG/DL
CO2 SERPL-SCNC: 27 MMOL/L (ref 21–32)
CREAT SERPL-MCNC: 0.66 MG/DL (ref 0.6–1.3)
ERYTHROCYTE [DISTWIDTH] IN BLOOD BY AUTOMATED COUNT: 12 % (ref 11.6–15.1)
GFR SERPL CREATININE-BSD FRML MDRD: 90 ML/MIN/1.73SQ M
GLUCOSE P FAST SERPL-MCNC: 98 MG/DL (ref 65–99)
HCT VFR BLD AUTO: 44.8 % (ref 34.8–46.1)
HCV AB SER QL: NORMAL
HDLC SERPL-MCNC: 48 MG/DL
HGB BLD-MCNC: 14.4 G/DL (ref 11.5–15.4)
LDLC SERPL CALC-MCNC: 206 MG/DL (ref 0–100)
MCH RBC QN AUTO: 31.1 PG (ref 26.8–34.3)
MCHC RBC AUTO-ENTMCNC: 32.1 G/DL (ref 31.4–37.4)
MCV RBC AUTO: 97 FL (ref 82–98)
PLATELET # BLD AUTO: 298 THOUSANDS/UL (ref 149–390)
PMV BLD AUTO: 10.6 FL (ref 8.9–12.7)
POTASSIUM SERPL-SCNC: 4.3 MMOL/L (ref 3.5–5.3)
PROT SERPL-MCNC: 6.8 G/DL (ref 6.4–8.4)
RBC # BLD AUTO: 4.63 MILLION/UL (ref 3.81–5.12)
RHEUMATOID FACT SER QL LA: NEGATIVE
SODIUM SERPL-SCNC: 139 MMOL/L (ref 135–147)
TRIGL SERPL-MCNC: 219 MG/DL
TSH SERPL DL<=0.05 MIU/L-ACNC: 1.28 UIU/ML (ref 0.45–4.5)
WBC # BLD AUTO: 6.62 THOUSAND/UL (ref 4.31–10.16)

## 2024-06-27 PROCEDURE — 36415 COLL VENOUS BLD VENIPUNCTURE: CPT

## 2024-06-27 PROCEDURE — 86200 CCP ANTIBODY: CPT

## 2024-06-27 PROCEDURE — 82306 VITAMIN D 25 HYDROXY: CPT

## 2024-06-27 PROCEDURE — 80061 LIPID PANEL: CPT

## 2024-06-27 PROCEDURE — 80053 COMPREHEN METABOLIC PANEL: CPT

## 2024-06-27 PROCEDURE — 85027 COMPLETE CBC AUTOMATED: CPT

## 2024-06-27 PROCEDURE — 86430 RHEUMATOID FACTOR TEST QUAL: CPT

## 2024-06-27 PROCEDURE — 86803 HEPATITIS C AB TEST: CPT

## 2024-06-27 PROCEDURE — 84443 ASSAY THYROID STIM HORMONE: CPT

## 2024-06-28 ENCOUNTER — TELEPHONE (OUTPATIENT)
Dept: FAMILY MEDICINE CLINIC | Facility: CLINIC | Age: 69
End: 2024-06-28

## 2024-06-29 LAB — CCP AB SER IA-ACNC: 0.5

## 2024-07-25 DIAGNOSIS — G47.00 INSOMNIA, UNSPECIFIED TYPE: ICD-10-CM

## 2024-07-25 RX ORDER — ZOLPIDEM TARTRATE 10 MG/1
10 TABLET ORAL
Qty: 90 TABLET | Refills: 0 | Status: SHIPPED | OUTPATIENT
Start: 2024-07-25

## 2024-07-25 NOTE — TELEPHONE ENCOUNTER
zolpidem (AMBIEN) 10 mg tablet          Sig: Take 1 tablet (10 mg total) by mouth daily at bedtime as needed for sleep    Disp: 90 tablet    Refills: 0    Start: 7/25/2024    Class: Normal    PDMP Review May Be Needed    Non-formulary For: Insomnia, unspecified type    Last ordered: 2 months ago (4/29/2024) by Omar Chavez DO    Psychiatry:  [facility]xiolytics/[facility]not[facility] [id number]/2024 09:01 AM   Protocol Details This refill cannot be delegated    Valid encounter within last 6 months      To be filled at: [facility] [place] - [address] [place] [address]

## 2024-09-24 ENCOUNTER — OFFICE VISIT (OUTPATIENT)
Dept: FAMILY MEDICINE CLINIC | Facility: CLINIC | Age: 69
End: 2024-09-24
Payer: COMMERCIAL

## 2024-09-24 VITALS
WEIGHT: 174.2 LBS | HEART RATE: 84 BPM | OXYGEN SATURATION: 96 % | SYSTOLIC BLOOD PRESSURE: 136 MMHG | HEIGHT: 66 IN | RESPIRATION RATE: 16 BRPM | DIASTOLIC BLOOD PRESSURE: 70 MMHG | BODY MASS INDEX: 28 KG/M2 | TEMPERATURE: 97.2 F

## 2024-09-24 DIAGNOSIS — I10 ESSENTIAL HYPERTENSION: Primary | ICD-10-CM

## 2024-09-24 DIAGNOSIS — M25.551 PAIN OF RIGHT HIP: ICD-10-CM

## 2024-09-24 DIAGNOSIS — E55.9 VITAMIN D DEFICIENCY: ICD-10-CM

## 2024-09-24 DIAGNOSIS — Z12.31 ENCOUNTER FOR SCREENING MAMMOGRAM FOR BREAST CANCER: ICD-10-CM

## 2024-09-24 DIAGNOSIS — Z12.12 SCREENING FOR COLORECTAL CANCER: ICD-10-CM

## 2024-09-24 DIAGNOSIS — F32.A DEPRESSION, UNSPECIFIED DEPRESSION TYPE: ICD-10-CM

## 2024-09-24 DIAGNOSIS — E78.2 MIXED HYPERLIPIDEMIA: ICD-10-CM

## 2024-09-24 DIAGNOSIS — Z12.11 SCREENING FOR COLORECTAL CANCER: ICD-10-CM

## 2024-09-24 DIAGNOSIS — F41.9 ANXIETY: ICD-10-CM

## 2024-09-24 DIAGNOSIS — G47.00 INSOMNIA, UNSPECIFIED TYPE: ICD-10-CM

## 2024-09-24 DIAGNOSIS — E03.9 HYPOTHYROIDISM, UNSPECIFIED TYPE: ICD-10-CM

## 2024-09-24 PROCEDURE — G2211 COMPLEX E/M VISIT ADD ON: HCPCS | Performed by: FAMILY MEDICINE

## 2024-09-24 PROCEDURE — 99214 OFFICE O/P EST MOD 30 MIN: CPT | Performed by: FAMILY MEDICINE

## 2024-09-24 RX ORDER — VENLAFAXINE HYDROCHLORIDE 150 MG/1
150 CAPSULE, EXTENDED RELEASE ORAL DAILY
Qty: 90 CAPSULE | Refills: 1 | Status: SHIPPED | OUTPATIENT
Start: 2024-09-24

## 2024-09-24 RX ORDER — CHOLECALCIFEROL (VITAMIN D3) 25 MCG
2000 TABLET ORAL DAILY
Qty: 30 TABLET | Refills: 0 | Status: SHIPPED | OUTPATIENT
Start: 2024-09-24

## 2024-09-24 RX ORDER — ROSUVASTATIN CALCIUM 5 MG/1
5 TABLET, COATED ORAL DAILY
Qty: 30 TABLET | Refills: 5 | Status: SHIPPED | OUTPATIENT
Start: 2024-09-24

## 2024-09-24 NOTE — PROGRESS NOTES
Assessment/Plan:   Discussed diagnostic and treatment options with patient.  Patient obtain x-ray of the right hip.  Patient is being referred for physical therapy evaluation and treatment.  Discussed referral to orthopedics if not improved.  Patient to start rosuvastatin 5 mg daily.  Discussed potential side effects.  Patient to obtain fasting lipid and liver profile in 1 month.  Patient to schedule screening mammogram for next month and complete home Cologuard testing.  Form completed for handicap parking placard.  Return to the office in 6 months.   Diagnoses and all orders for this visit:    Essential hypertension    Mixed hyperlipidemia  -     rosuvastatin (CRESTOR) 5 mg tablet; Take 1 tablet (5 mg total) by mouth daily  -     Lipid Panel with Direct LDL reflex; Future  -     Hepatic function panel; Future    Hypothyroidism, unspecified type    Pain of right hip  -     XR hip/pelv 2-3 vws right if performed; Future  -     Ambulatory Referral to Physical Therapy; Future    Depression, unspecified depression type  -     venlafaxine (EFFEXOR-XR) 150 mg 24 hr capsule; Take 1 capsule (150 mg total) by mouth daily    Anxiety    Insomnia, unspecified type    Vitamin D deficiency    Encounter for screening mammogram for breast cancer  -     Mammo screening bilateral w 3d and cad; Future    Screening for colorectal cancer  -     Cologuard    Other orders  -     cholecalciferol (VITAMIN D3) 1,000 units tablet; Take 2 tablets (2,000 Units total) by mouth daily          Subjective:     Patient ID: Padmini Sharma is a 68 y.o. female.    Patient is here for follow-up appoint for chronic conditions and reviewed fasting labs in 3 months ago.  Patient has been feeling fairly well overall although admits to chronic low back pain and right hip pain which limits her activity level and she requests parking placard for handicap parking.  Patient recently saw [facility] rheumatology.  She is requesting referral for physical  therapy.  Patient quit smoking 1-1/2 years ago.  Patient is due for mammogram next month and plans to schedule.  Patient declines colonoscopy although agrees to Cologuard testing.  Patient states she could not tolerate Lipitor and Zocor in the past secondary to muscle aches and pains.  She is willing to try Crestor.    Hypertension  This is a chronic problem. The problem is controlled. Associated symptoms include anxiety. Pertinent negatives include no blurred vision, chest pain, headaches, orthopnea, palpitations, peripheral edema, PND or shortness of breath. Risk factors for coronary artery disease include dyslipidemia, family history, post-menopausal state and smoking/tobacco exposure. Past treatments include ACE inhibitors. The current treatment provides significant improvement. Compliance problems include exercise.  There is no history of CAD/MI or CVA.   Hip Pain   There was no injury mechanism. The pain is present in the right hip. The quality of the pain is described as aching. The pain has been Constant since onset. Associated symptoms include a loss of motion. Pertinent negatives include no inability to bear weight, muscle weakness, numbness or tingling. The symptoms are aggravated by weight bearing. She has tried NSAIDs, acetaminophen, ice and heat for the symptoms. The treatment provided mild relief.       Review of Systems   Eyes:  Negative for blurred vision.   Respiratory:  Negative for shortness of breath.    Cardiovascular:  Negative for chest pain, palpitations, orthopnea and PND.   Neurological:  Negative for tingling, numbness and headaches.         Objective:     Physical Exam  Constitutional:       General: She is not in acute distress.     Appearance: Normal appearance.   HENT:      Head: Normocephalic.      Mouth/Throat:      Mouth: Mucous membranes are moist.   Eyes:      General: No scleral icterus.     Conjunctiva/sclera: Conjunctivae normal.   Neck:      Vascular: No carotid bruit.    Cardiovascular:      Rate and Rhythm: Normal rate and regular rhythm.   Pulmonary:      Effort: Pulmonary effort is normal.      Breath sounds: Normal breath sounds.   Abdominal:      Palpations: Abdomen is soft.      Tenderness: There is no abdominal tenderness.   Musculoskeletal:         General: Tenderness present.      Cervical back: Neck supple.      Right lower leg: No edema.      Left lower leg: No edema.      Comments: Positive right straight leg raise and positive right GABBI tenderness.   Lymphadenopathy:      Cervical: No cervical adenopathy.   Skin:     General: Skin is warm and dry.   Neurological:      General: No focal deficit present.      Mental Status: She is alert and oriented to person, place, and time.   Psychiatric:         Mood and Affect: Mood normal.         Behavior: Behavior normal.         Thought Content: Thought content normal.         Judgment: Judgment normal.

## 2024-09-27 ENCOUNTER — HOSPITAL ENCOUNTER (OUTPATIENT)
Dept: RADIOLOGY | Facility: IMAGING CENTER | Age: 69
End: 2024-09-27
Payer: COMMERCIAL

## 2024-09-27 DIAGNOSIS — M25.551 PAIN OF RIGHT HIP: ICD-10-CM

## 2024-09-27 PROCEDURE — 73502 X-RAY EXAM HIP UNI 2-3 VIEWS: CPT

## 2024-09-30 ENCOUNTER — TELEPHONE (OUTPATIENT)
Dept: FAMILY MEDICINE CLINIC | Facility: CLINIC | Age: 69
End: 2024-09-30

## 2024-09-30 NOTE — TELEPHONE ENCOUNTER
Received fax from Clarisse Mcnamara from [facility]. Put on Dr. Chavez's desk.

## 2024-10-14 ENCOUNTER — EVALUATION (OUTPATIENT)
Dept: PHYSICAL THERAPY | Facility: REHABILITATION | Age: 69
End: 2024-10-14
Payer: COMMERCIAL

## 2024-10-14 DIAGNOSIS — M25.551 PAIN OF RIGHT HIP: Primary | ICD-10-CM

## 2024-10-14 DIAGNOSIS — M54.16 LUMBAR RADICULOPATHY: ICD-10-CM

## 2024-10-14 PROCEDURE — 97110 THERAPEUTIC EXERCISES: CPT | Performed by: PHYSICAL THERAPIST

## 2024-10-14 PROCEDURE — 97535 SELF CARE MNGMENT TRAINING: CPT | Performed by: PHYSICAL THERAPIST

## 2024-10-14 PROCEDURE — 97161 PT EVAL LOW COMPLEX 20 MIN: CPT | Performed by: PHYSICAL THERAPIST

## 2024-10-14 NOTE — PROGRESS NOTES
PT Evaluation     Today's date: 10/14/2024  Patient name: Padmini Sharma  : 1955  MRN: [id number]  Referring provider: Omar Chavez DO  Dx:   Encounter Diagnosis     ICD-10-CM    1. Pain of right hip  M25.551 Ambulatory Referral to Physical Therapy      2. Lumbar radiculopathy  M54.16           Start Time: 1405  Stop Time: 1450  Total time in clinic (min): 45 minutes    Assessment  Impairments: abnormal or restricted ROM, activity intolerance, impaired balance, lacks appropriate home exercise program, pain with function, weight-bearing intolerance, participation limitations and activity limitations  Symptom irritability: moderate    Assessment details: Padmini Sharma presents to outpatient physical with complaints of R hip pain with history of lumbar L2-5 fusion secondary to osteoarthritis. Pt presents with decreased lumbar ROM with extension and side gliding being most limited, decreased R hip PROM secondary to pain and impingement, decreased R>L proximal hip MMT, (+) R FADIR, (-) GABBI and slump testing leading to activity intolerance.     Based on patient's age and motivation, patient is a positive candidate to respond favorably to physical therapy with a good prognosis. Patient was educated on examination and techniques, plan of care, goals of therapy, and HEP. Patient was provided an opportunity to ask any questions. Provided Pt initial HEP. Patient demonstrated and verbalized understanding. Verbally reported to hold exercises if increased pain or discomfort. Pt will be seen 1-2x/week for 6-12 weeks. Patient will be re-assessed regularly in order to document progress and limit any regression. Thank you for this referral.    Understanding of Dx/Px/POC: good     Prognosis: good    Goals  ST. Pt will improve R hip/lumbar spine ROM by 25% in 4 weeks  2. Pt will improve b/l LE MMT by 1/2 grade in 4 weeks  3. Pt will be able to stand for 30 minutes with pain at worst of 5/10 in 4 weeks  4.  Pt will improve SLS duration by 10 seconds in 4 weeks  5. Pt will be independent with initial HEP in 4 weeks      LT. Pt will improve FOTO score by indicated measure by discharge  2. Pt will be independent with ADLs/IADLs with pain at worst of 2/10 by discharge  3. Pt will improve R hip/lumbar ROM to WFL by discharge  4. Pt will improve b/l LE MMT to at least 4+/5 by discharge  5. Pt will improve SLS duration to 25-30 seconds by discharge to reduce fall risk    Plan  Patient would benefit from: PT eval and skilled physical therapy  Planned modality interventions: cryotherapy and thermotherapy: hydrocollator packs    Planned therapy interventions: joint mobilization, manual therapy, neuromuscular re-education, patient/caregiver education, self care, strengthening, stretching, therapeutic activities, therapeutic exercise, home exercise program, graded exercise, graded activity, functional ROM exercises, flexibility, abdominal trunk stabilization and balance    Frequency: 2x week  Duration in weeks: 8  Treatment plan discussed with: patient  Plan details: Advancement of treating        Subjective Evaluation    History of Present Illness  Mechanism of injury: Padmini Sharma is a 68 y.o. female that presents to outpatient physical therapy stating that she has been experiencing R hip pain over the last year that is worse in the morning. She has a history of lumbar fusion of L2-5 and discectomy with interbody spacer devices at L3-L4 and L4-L5. Pt denies improvement of low back pain following surgical intervention. She continues to have throbbing pain in central lumbar spine. Pt reports constant pain however worsens with prolonged ambulation or sitting into bilateral groin regions. She has been utilizing Rashawn Copper brace for lumbar spine which reduce pain intensity upon wakening. She is currently taking OTC medication which provides minimal relief. She recently received x-ray which indicated Preserved right hip  joint space without significant degenerative narrowing. Mild to moderate degenerative osteophyte formation however at the margins of the acetabulum and femoral head.  Extensive lumbar spine fusion with scoliosis. Mild pelvic tilt, left hip slightly higher than right.      Pt denies N/T into bilateral LE, saddle paresthesia, B/b changes recently however reports urge incontinence however has not followed up with urologist or PCP about this concern, denies night pain or night sweats or history of cancer.  Quality of life: good    Patient Goals  Patient goals for therapy: decreased pain, increased motion, improved balance, independence with ADLs/IADLs and increased strength    Pain  Current pain ratin  At worst pain rating: 10  Location: Right hip/lumbar spine  Quality: throbbing, dull ache and sharp  Relieving factors: rest, support, relaxation and heat  Aggravating factors: sitting, walking, standing and stair climbing      Diagnostic Tests  X-ray: abnormal    FCE comments: Preserved right hip joint space without significant degenerative narrowing.  Mild to moderate degenerative osteophyte formation however at the margins of the acetabulum and femoral head.  Extensive lumbar spine fusion with scoliosis. Mild pelvic tilt, left hip slightly higher than right        Objective     Concurrent Complaints  Negative for night pain, disturbed sleep, bladder dysfunction, bowel dysfunction, saddle (S4) numbness, cardiac problem, kidney problem, gallbladder problem, stomach problem, ulcer, appendix problem, spleen problem, pancreas problem, history of cancer, history of trauma and infection    Static Posture     Lumbar Spine   Lumbar spine (Left): Concave curve and shifted.   Lumbar spine (Right): Convex curve.      Postural Observations  Seated posture: fair  Standing posture: fair  Correction of posture: has no consistent effect      Tenderness     Right Hip   Tenderness in the greater trochanter, iliac crest and sacroiliac  joint.     Neurological Testing     Sensation     Lumbar   Left   Intact: light touch    Right   Intact: light touch    Active Range of Motion     Lumbar   Flexion:  with pain Restriction level: minimal  Extension:  Restriction level: moderate    Passive Range of Motion   Left Hip   Normal passive range of motion    Right Hip   Flexion: 90 degrees with pain  External rotation (90/90): WFL  Internal rotation (90/90): WFL and with pain    Joint Play     Right Hip     Hypomobile in the posterior hip capsule and lateral hip capsule  Mechanical Assessment    Cervical      Thoracic      Lumbar    Standing flexion: repeated movements   Pain location:no change  Standing extension: repeated movements  Pain location: no change  Left Sidegliding: repeated movements  Pain location: no change  Right sidegliding: repeated movements  Pain location: no change    Strength/Myotome Testing     Left Hip   Planes of Motion   Flexion: 5  Extension: 4+  Abduction: 5  Adduction: 5  External rotation: 5  Internal rotation: 5    Right Hip   Planes of Motion   Flexion: 4  Extension: 4  Abduction: 4+  Adduction: 5  External rotation: 4+  Internal rotation: 5    Left Knee   Flexion: 5  Extension: 5    Right Knee   Flexion: 5  Extension: 5    Left Ankle/Foot   Dorsiflexion: 5    Right Ankle/Foot   Dorsiflexion: 5    Tests     Lumbar   Negative SIJ compression, sacroiliac distraction and sacral spring .     Left   Negative crossed SLR, passive SLR, quadrant and slump test.     Right   Negative crossed SLR, passive SLR, quadrant and slump test.     Left Pelvic Girdle/Sacrum   Negative: active SLR test.     Right Pelvic Girdle/Sacrum   Negative: active SLR test.     Left Hip   Negative GABBI, FADIR, scour, SI compression and SI distraction.     Right Hip   Positive FADIR.   Negative GABBI, scour, SI compression and SI distraction.   Wan: Positive.              Precautions:  Patient Active Problem List   Diagnosis    Allergic rhinitis     "Arthritis    Depression    Eczema    Neck pain    Glenohumeral arthritis, left    Hyperlipidemia    Essential hypertension    Hypothyroidism    Insomnia    Primary localized osteoarthritis of left knee    Primary localized osteoarthritis of right knee    Vitamin D deficiency    Anxiety    Osteoarthritis of carpometacarpal (CMC) joint of thumb    Acquired trigger finger    Carpal tunnel syndrome    Low back pain    Flu-like symptoms       Patient's Goals:     10/14            FOTO Comp            Eval/Re-eval IE                         Education                          Manuals             R hip PROM             R hip lat hip mobs nv                         Ther Ex             RF/HS stretch nv            LTR 5\"x10            SKTC 10\"x5            SLR-flex, abd x10            Clamshells 5\"x10            Bridges 5\"x10            Hip abd/add PTB 5\" x10            Squats  nv                                                   Neuro Re-ed             SLS              Biodex LOS            Tandem             Ther Activity             Bike (MHP)                          Gait Training                                       Modalities                                                "

## 2024-10-16 ENCOUNTER — OFFICE VISIT (OUTPATIENT)
Dept: PHYSICAL THERAPY | Facility: REHABILITATION | Age: 69
End: 2024-10-16
Payer: COMMERCIAL

## 2024-10-16 DIAGNOSIS — M54.16 LUMBAR RADICULOPATHY: ICD-10-CM

## 2024-10-16 DIAGNOSIS — M25.551 PAIN OF RIGHT HIP: Primary | ICD-10-CM

## 2024-10-16 PROCEDURE — 97110 THERAPEUTIC EXERCISES: CPT | Performed by: PHYSICAL THERAPIST

## 2024-10-16 PROCEDURE — 97112 NEUROMUSCULAR REEDUCATION: CPT | Performed by: PHYSICAL THERAPIST

## 2024-10-16 PROCEDURE — 97140 MANUAL THERAPY 1/> REGIONS: CPT | Performed by: PHYSICAL THERAPIST

## 2024-10-16 NOTE — PROGRESS NOTES
Daily Note     Today's date: 10/16/2024  Patient name: Padmini Sharma  : 1955  MRN: [id number]  Referring provider: Omar Chavez DO  Dx:   Encounter Diagnosis     ICD-10-CM    1. Pain of right hip  M25.551       2. Lumbar radiculopathy  M54.16           Start Time: 1020  Stop Time: 1100  Total time in clinic (min): 40 minutes    Subjective: Pt reports an increase in soreness of R anterior hip following exercises and was only able to complete 5 repetitions of each exercise due to pain. Pt notes that when she increases movement following, symptoms do improve.      Objective: See treatment diary below      Assessment: Pt tolerated treatment well this visit despite increased R lateral hip pain. Pt demonstrated favorable response to manual inferior hip mobilizations resulting in improvement in hip flexion ROM. Pt did experience pain with hip abduction in supine and regressed exercise to isometrics with reduction in reported pain. Pt denied increase pain post-session however demonstrate antalgic gait pattern with decreased R knee flexion and dorsiflexion during swing through phase. Pt would continue to benefit from skilled PT.       Plan: Continue per plan of care.      Precautions:  Patient Active Problem List   Diagnosis    Allergic rhinitis    Arthritis    Depression    Eczema    Neck pain    Glenohumeral arthritis, left    Hyperlipidemia    Essential hypertension    Hypothyroidism    Insomnia    Primary localized osteoarthritis of left knee    Primary localized osteoarthritis of right knee    Vitamin D deficiency    Anxiety    Osteoarthritis of carpometacarpal (CMC) joint of thumb    Acquired trigger finger    Carpal tunnel syndrome    Low back pain    Flu-like symptoms       Patient's Goals:     10/14 10/16           FOTO Comp            Eval/Re-eval IE                         Education                          Manuals             R hip PROM  EDGARD flexion            R hip lat/inferior hip mobs nv EDGARD    "          R LE LAD  EDGARD           Ther Ex             RF/HS stretch nv            LTR 5\"x10 5\"x10           SKTC 10\"x5 np           SLR-flex, abd x10 2x5           Clamshells 5\"x10 np           Bridges 5\"x10 5\"x10           Hip abd/add PTB 5\" x10 Hip ISO 5\" 2x10           Squats  nv            3-way forward flexion   Pball 5\"x10                                     Neuro Re-ed             SLS              Biodex LOS            Tandem             Ther Activity             Bike (MHP)  L1 5'                        Gait Training                                       Modalities                                                "

## 2024-10-21 ENCOUNTER — OFFICE VISIT (OUTPATIENT)
Dept: PHYSICAL THERAPY | Facility: REHABILITATION | Age: 69
End: 2024-10-21
Payer: COMMERCIAL

## 2024-10-21 DIAGNOSIS — M54.16 LUMBAR RADICULOPATHY: ICD-10-CM

## 2024-10-21 DIAGNOSIS — M25.551 PAIN OF RIGHT HIP: Primary | ICD-10-CM

## 2024-10-21 PROCEDURE — 97112 NEUROMUSCULAR REEDUCATION: CPT | Performed by: PHYSICAL THERAPIST

## 2024-10-21 PROCEDURE — 97110 THERAPEUTIC EXERCISES: CPT | Performed by: PHYSICAL THERAPIST

## 2024-10-21 PROCEDURE — 97140 MANUAL THERAPY 1/> REGIONS: CPT | Performed by: PHYSICAL THERAPIST

## 2024-10-21 NOTE — PROGRESS NOTES
"Daily Note     Today's date: 10/21/2024  Patient name: Padmini Sharma  : 1955  MRN: [id number]  Referring provider: Omar Chavez DO  Dx:   Encounter Diagnosis     ICD-10-CM    1. Pain of right hip  M25.551       2. Lumbar radiculopathy  M54.16           Start Time: 1030  Stop Time: 1110  Total time in clinic (min): 40 minutes    Subjective: Pt reports reduction in pain following last session. Has been compliant with HEP and good tolerance noted.      Objective: See treatment diary below      Assessment: Pt tolerated treatment well this visit. Pt able to progress LE strengthening exercises this date without an increase in pain. Favorable response noted with manual lateral and long axis distraction mobilization resulting in improvement in R hip ROM. Pt denied increase in pain however muscular fatigue reported post-session. Pt would continue to benefit from progressive resistance training to reduce pain and improve tolerance to functional activities.       Plan: Continue per plan of care.      Precautions:  Patient Active Problem List   Diagnosis    Allergic rhinitis    Arthritis    Depression    Eczema    Neck pain    Glenohumeral arthritis, left    Hyperlipidemia    Essential hypertension    Hypothyroidism    Insomnia    Primary localized osteoarthritis of left knee    Primary localized osteoarthritis of right knee    Vitamin D deficiency    Anxiety    Osteoarthritis of carpometacarpal (CMC) joint of thumb    Acquired trigger finger    Carpal tunnel syndrome    Low back pain    Flu-like symptoms       Patient's Goals:     10/14 10/16 10/21          FOTO Comp            Eval/Re-eval IE                         Education                          Manuals             R hip PROM  EDGARD flexion  EDGARD          R hip lat/inferior hip mobs nv EDGARD   EDGARD          R LE LAD  EDGARD EDGARD          Ther Ex             RF/HS stretch nv            LTR 5\"x10 5\"x10 5\"x10          SKTC 10\"x5 np           SLR-flex, abd x10 2x5 2x5     " "     Clamshells 5\"x10 np           Bridges 5\"x10 5\"x10 5\"x10          Hip abd/add PTB 5\" x10 Hip ISO 5\" 2x10 Hip ISO 5\" 3x10          Squats  nv            3-way forward flexion   Pball 5\"x10 Pball 5\"x10                                    Neuro Re-ed             SLS              Biodex LOS            Tandem             Ther Activity             Bike (MHP)  L1 5' L1 6'          SA leg press   44# 2x10          Gait Training                                       Modalities                                                  "

## 2024-10-24 ENCOUNTER — APPOINTMENT (OUTPATIENT)
Dept: PHYSICAL THERAPY | Facility: REHABILITATION | Age: 69
End: 2024-10-24
Payer: COMMERCIAL

## 2024-10-27 DIAGNOSIS — G47.00 INSOMNIA, UNSPECIFIED TYPE: ICD-10-CM

## 2024-10-28 ENCOUNTER — OFFICE VISIT (OUTPATIENT)
Dept: PHYSICAL THERAPY | Facility: REHABILITATION | Age: 69
End: 2024-10-28
Payer: COMMERCIAL

## 2024-10-28 DIAGNOSIS — M54.16 LUMBAR RADICULOPATHY: ICD-10-CM

## 2024-10-28 DIAGNOSIS — M25.551 PAIN OF RIGHT HIP: Primary | ICD-10-CM

## 2024-10-28 PROCEDURE — 97110 THERAPEUTIC EXERCISES: CPT | Performed by: PHYSICAL THERAPIST

## 2024-10-28 RX ORDER — ZOLPIDEM TARTRATE 10 MG/1
10 TABLET ORAL
Qty: 90 TABLET | Refills: 0 | Status: SHIPPED | OUTPATIENT
Start: 2024-10-28

## 2024-10-28 NOTE — TELEPHONE ENCOUNTER
zolpidem (AMBIEN) 10 mg tablet          Sig: Take 1 tablet (10 mg total) by mouth daily at bedtime as needed for sleep    Disp: 90 tablet    Refills: 0    Start: 10/27/2024    Class: Normal    PDMP Review May Be Needed    Non-formulary For: Insomnia, unspecified type    Last ordered: 3 months ago (7/25/2024) by Omar Chavez DO    Psychiatry:  [facility] Agns[facility]15/27/2024 11:10 AM   Protocol Details This refill cannot be delegated    Valid encounter within last 6 months      To be filled at: [facility] [place], [place] - [address] [place] [address]

## 2024-10-28 NOTE — PROGRESS NOTES
"Daily Note     Today's date: 10/28/2024  Patient name: Padmini Sharma  : 1955  MRN: [id number]  Referring provider: Omar Chavez DO  Dx:   Encounter Diagnosis     ICD-10-CM    1. Pain of right hip  M25.551       2. Lumbar radiculopathy  M54.16           Start Time: 1345  Stop Time: 1425  Total time in clinic (min): 40 minutes    Subjective: Pt reports increased R sided low back pain with LTR performed during HEP. She notes she was recently sick last week and noticed an increase in pain following.       Objective: See treatment diary below      Assessment: Pt tolerated treatment well this visit despite regression of exercises to improve tolerance based off patient's subjective reporting. Pt able to complete all exercises with minimal to nil discomfort throughout session. Pt educated on performing exercises with appropriate technique and completing every other day to improve tolerance. Pt demonstrated good understanding and carryover. Pt would continue to benefit from skilled PT.      Plan: Continue per plan of care.      Precautions:  Patient Active Problem List   Diagnosis    Allergic rhinitis    Arthritis    Depression    Eczema    Neck pain    Glenohumeral arthritis, left    Hyperlipidemia    Essential hypertension    Hypothyroidism    Insomnia    Primary localized osteoarthritis of left knee    Primary localized osteoarthritis of right knee    Vitamin D deficiency    Anxiety    Osteoarthritis of carpometacarpal (CMC) joint of thumb    Acquired trigger finger    Carpal tunnel syndrome    Low back pain    Flu-like symptoms       Patient's Goals:     10/14 10/16 10/21 10/28         FOTO Comp            Eval/Re-eval IE                         Education                          Manuals             R hip PROM  EDGARD flexion  EDGARD          R hip lat/inferior hip mobs nv EDGARD   EDGARD          R LE LAD  EDGARD EDGARD          Ther Ex             RF/HS stretch nv            LTR 5\"x10 5\"x10 5\"x10 np         SKTC 10\"x5 np  " "10\"x5         SLR-flex, abd x10 2x5 2x5          Clamshells 5\"x10 np           Bridges 5\"x10 5\"x10 5\"x10 5\"x10         Hip abd/add PTB 5\" x10 Hip ISO 5\" 2x10 Hip ISO 5\" 3x10 Hip ISO 5\" 3x10         Squats  nv            3-way forward flexion   Pball 5\"x10 Pball 5\"x10 Pball flex 5\"x10                                   Neuro Re-ed             SLS              Biodex LOS            Tandem             Ther Activity             Bike (MHP)  L1 5' L1 6' L1 6'         SA leg press   44# 2x10          Gait Training                                       Modalities                                                    "

## 2024-10-31 ENCOUNTER — OFFICE VISIT (OUTPATIENT)
Dept: PHYSICAL THERAPY | Facility: REHABILITATION | Age: 69
End: 2024-10-31
Payer: COMMERCIAL

## 2024-10-31 DIAGNOSIS — M54.16 LUMBAR RADICULOPATHY: ICD-10-CM

## 2024-10-31 DIAGNOSIS — M25.551 PAIN OF RIGHT HIP: Primary | ICD-10-CM

## 2024-10-31 PROCEDURE — 97110 THERAPEUTIC EXERCISES: CPT | Performed by: PHYSICAL THERAPIST

## 2024-10-31 NOTE — PROGRESS NOTES
"Daily Note     Today's date: 10/31/2024  Patient name: Padmini Sharma  : 1955  MRN: [id number]  Referring provider: Omar Chavez DO  Dx:   Encounter Diagnosis     ICD-10-CM    1. Pain of right hip  M25.551       2. Lumbar radiculopathy  M54.16           Start Time: 1030  Stop Time: 1108  Total time in clinic (min): 38 minutes    Subjective: Pt reports a significant improvement in R hip pain following previous session. She notes that she reduced intensity of exercises provided and rested as needed.      Objective: See treatment diary below      Assessment: Pt tolerated treatment well this visit. Pt demonstrated good challenge and improvement in ambulation quality following interventions as noted below. Pt denied pain following PT session and able to ambulate without difficulty. Pt would continue to benefit from skilled PT to address strength and ROM deficits.       Plan: Continue per plan of care.      Precautions:  Patient Active Problem List   Diagnosis    Allergic rhinitis    Arthritis    Depression    Eczema    Neck pain    Glenohumeral arthritis, left    Hyperlipidemia    Essential hypertension    Hypothyroidism    Insomnia    Primary localized osteoarthritis of left knee    Primary localized osteoarthritis of right knee    Vitamin D deficiency    Anxiety    Osteoarthritis of carpometacarpal (CMC) joint of thumb    Acquired trigger finger    Carpal tunnel syndrome    Low back pain    Flu-like symptoms       Patient's Goals:     10/14 10/16 10/21 10/28 10/31        FOTO Comp            Eval/Re-eval IE                         Education                          Manuals             R hip PROM  EDGARD flexion  EDGARD          R hip lat/inferior hip mobs nv EDGARD   EDGARD          R LE LAD  EDGARD EDGARD          Ther Ex             RF/HS stretch nv            LTR 5\"x10 5\"x10 5\"x10 np         SKTC 10\"x5 np  10\"x5 10\"x5        SLR-flex, abd x10 2x5 2x5          Clamshells 5\"x10 np           Bridges 5\"x10 5\"x10 5\"x10 5\"x10 " "5\"x10        Hip abd/add PTB 5\" x10 Hip ISO 5\" 2x10 Hip ISO 5\" 3x10 Hip ISO 5\" 3x10 Hip ISO 5\" 3x10        Squats  nv            3-way forward flexion   Pball 5\"x10 Pball 5\"x10 Pball flex 5\"x10 Pball flex 5\"x10                                  Neuro Re-ed             SLS              Biodex LOS            Tandem             Ther Activity             Bike (MHP)  L1 5' L1 6' L1 6' L 8'        SA leg press   44# 2x10          Gait Training                                       Modalities                                                      "

## 2024-11-04 ENCOUNTER — OFFICE VISIT (OUTPATIENT)
Dept: PHYSICAL THERAPY | Facility: REHABILITATION | Age: 69
End: 2024-11-04
Payer: COMMERCIAL

## 2024-11-04 DIAGNOSIS — M25.551 PAIN OF RIGHT HIP: Primary | ICD-10-CM

## 2024-11-04 DIAGNOSIS — M54.16 LUMBAR RADICULOPATHY: ICD-10-CM

## 2024-11-04 PROCEDURE — 97110 THERAPEUTIC EXERCISES: CPT | Performed by: PHYSICAL THERAPIST

## 2024-11-04 NOTE — PROGRESS NOTES
"Daily Note     Today's date: 2024  Patient name: Padmini Sharma  : 1955  MRN: [id number]  Referring provider: Omar Chavez DO  Dx:   Encounter Diagnosis     ICD-10-CM    1. Pain of right hip  M25.551       2. Lumbar radiculopathy  M54.16           Start Time: 1030  Stop Time: 1108  Total time in clinic (min): 38 minutes    Subjective: Pt reports that her R hip has been feeling much better. She has been experiencing bilateral hamstring/leg pain when standing from sitting for a period of time.       Objective: See treatment diary below      Assessment: Pt tolerated treatment fair this visit. Due to improvement noted upon arrival, progressions of hip abduction including supine and clamshells were progressed this visit however after completing, patient noted increased discomfort and difficulty with ambulating post-session. Pt would however continue to benefit from skilled PT to address deficits noted.      Plan: Continue per plan of care.      Precautions:  Patient Active Problem List   Diagnosis    Allergic rhinitis    Arthritis    Depression    Eczema    Neck pain    Glenohumeral arthritis, left    Hyperlipidemia    Essential hypertension    Hypothyroidism    Insomnia    Primary localized osteoarthritis of left knee    Primary localized osteoarthritis of right knee    Vitamin D deficiency    Anxiety    Osteoarthritis of carpometacarpal (CMC) joint of thumb    Acquired trigger finger    Carpal tunnel syndrome    Low back pain    Flu-like symptoms       Patient's Goals:     10/14 10/16 10/21 10/28 10/31 11/4       FOTO Comp            Eval/Re-eval IE                         Education                          Manuals             R hip PROM  EDGARD flexion  EDGARD          R hip lat/inferior hip mobs nv EDGARD   EDGARD          R LE LAD  EDGARD EDGARD          Ther Ex             RF/HS stretch nv            LTR 5\"x10 5\"x10 5\"x10 np         SKTC 10\"x5 np  10\"x5 10\"x5 10\"X5       SLR-flex, abd x10 2x5 2x5   5\"x10     " "  Clamshells 5\"x10 np    5\"x10       Bridges 5\"x10 5\"x10 5\"x10 5\"x10 5\"x10 5\"x10       Hip abd/add PTB 5\" x10 Hip ISO 5\" 2x10 Hip ISO 5\" 3x10 Hip ISO 5\" 3x10 Hip ISO 5\" 3x10 Abd OTB 5\" 2x10    Add ISO 5\" x20       Squats  nv            3-way forward flexion   Pball 5\"x10 Pball 5\"x10 Pball flex 5\"x10 Pball flex 5\"x10 Pball flex 5\"x10                                 Neuro Re-ed             SLS              Biodex LOS            Tandem             Ther Activity             Bike (MHP)  L1 5' L1 6' L1 6' L 8' L1 8'       SA leg press   44# 2x10          Gait Training                                       Modalities                                                        "

## 2024-11-06 ENCOUNTER — OFFICE VISIT (OUTPATIENT)
Dept: PHYSICAL THERAPY | Facility: REHABILITATION | Age: 69
End: 2024-11-06
Payer: COMMERCIAL

## 2024-11-06 DIAGNOSIS — M25.551 PAIN OF RIGHT HIP: Primary | ICD-10-CM

## 2024-11-06 DIAGNOSIS — M54.16 LUMBAR RADICULOPATHY: ICD-10-CM

## 2024-11-06 PROCEDURE — 97110 THERAPEUTIC EXERCISES: CPT | Performed by: PHYSICAL THERAPIST

## 2024-11-06 NOTE — PROGRESS NOTES
"Daily Note     Today's date: 2024  Patient name: Padmini Sharma  : 1955  MRN: [id number]  Referring provider: Omar Chavez DO  Dx:   Encounter Diagnosis     ICD-10-CM    1. Pain of right hip  M25.551       2. Lumbar radiculopathy  M54.16           Start Time: 1115  Stop Time: 1153  Total time in clinic (min): 38 minutes    Subjective: Pt reports that her R hip is sore following last session but has been sore since she notes she stepped wrong while helping her friend.       Objective: See treatment diary below      Assessment: Pt tolerated treatment fair this visit. Pt noted increased \"burning\" sensation with provided exercises requiring modification of hip abd/add isometrics. Pt demonstrated favorable response to symptoms reported with repeated lumbar extension. Pt would continue to benefit from skilled PT to address deficits.       Plan: Continue per plan of care.      Precautions:  Patient Active Problem List   Diagnosis    Allergic rhinitis    Arthritis    Depression    Eczema    Neck pain    Glenohumeral arthritis, left    Hyperlipidemia    Essential hypertension    Hypothyroidism    Insomnia    Primary localized osteoarthritis of left knee    Primary localized osteoarthritis of right knee    Vitamin D deficiency    Anxiety    Osteoarthritis of carpometacarpal (CMC) joint of thumb    Acquired trigger finger    Carpal tunnel syndrome    Low back pain    Flu-like symptoms       Patient's Goals:     10/14 10/16 10/21 10/28 10/31 11/4 11/6      FOTO Comp            Eval/Re-eval IE                         Education                          Manuals             R hip PROM  EDGARD flexion  EDGARD          R hip lat/inferior hip mobs nv EDGARD   EDGARD          R LE LAD  EDGARD EDGARD          Ther Ex             RF/HS stretch nv            LTR 5\"x10 5\"x10 5\"x10 np         SKTC 10\"x5 np  10\"x5 10\"x5 10\"X5 10\"x5      SLR-flex, abd x10 2x5 2x5   5\"x10 5\"x10      Clamshells 5\"x10 np    5\"x10 5\"x10      Bridges 5\"x10 5\"x10 " "5\"x10 5\"x10 5\"x10 5\"x10 5\"x10      Hip abd/add PTB 5\" x10 Hip ISO 5\" 2x10 Hip ISO 5\" 3x10 Hip ISO 5\" 3x10 Hip ISO 5\" 3x10 Abd OTB 5\" 2x10    Add ISO 5\" x20 Iso 5\" 3x10      Squats  nv            3-way forward flexion   Pball 5\"x10 Pball 5\"x10 Pball flex 5\"x10 Pball flex 5\"x10 Pball flex 5\"x10 Pball flex 5\"x10                                Neuro Re-ed             SLS              Biodex LOS            Tandem             NATHANAEL       x10      Ther Activity             Bike (MHP)  L1 5' L1 6' L1 6' L 8' L1 8' L1 8'      SA leg press   44# 2x10          Gait Training                                       Modalities                                                          "

## 2024-11-13 ENCOUNTER — OFFICE VISIT (OUTPATIENT)
Dept: PHYSICAL THERAPY | Facility: REHABILITATION | Age: 69
End: 2024-11-13
Payer: COMMERCIAL

## 2024-11-13 DIAGNOSIS — M54.16 LUMBAR RADICULOPATHY: ICD-10-CM

## 2024-11-13 DIAGNOSIS — M25.551 PAIN OF RIGHT HIP: Primary | ICD-10-CM

## 2024-11-13 PROCEDURE — 97110 THERAPEUTIC EXERCISES: CPT | Performed by: PHYSICAL THERAPIST

## 2024-11-13 NOTE — PROGRESS NOTES
"Daily Note     Today's date: 2024  Patient name: Padmini Sharma  : 1955  MRN: [id number]  Referring provider: Omar Chavez DO  Dx:   Encounter Diagnosis     ICD-10-CM    1. Pain of right hip  M25.551       2. Lumbar radiculopathy  M54.16           Start Time: 1300  Stop Time: 1342  Total time in clinic (min): 42 minutes    Subjective: Pt reports that she is doing much better since last visit.       Objective: See treatment diary below      Assessment: Pt tolerated treatment well this visit. Pt able to tolerate hip isometric strengthening however increased pain reported with active movements to improve proximal hip strength. Pt noted post-session less discomfort with improvement in strength. Pt continues to demonstrate antalgic gait pattern and decreased stability of R SLS during midstance. Pt would continue to benefit from skilled PT to address deficits and optimize LE function.       Plan: Continue per plan of care.      Precautions:  Patient Active Problem List   Diagnosis    Allergic rhinitis    Arthritis    Depression    Eczema    Neck pain    Glenohumeral arthritis, left    Hyperlipidemia    Essential hypertension    Hypothyroidism    Insomnia    Primary localized osteoarthritis of left knee    Primary localized osteoarthritis of right knee    Vitamin D deficiency    Anxiety    Osteoarthritis of carpometacarpal (CMC) joint of thumb    Acquired trigger finger    Carpal tunnel syndrome    Low back pain    Flu-like symptoms       Patient's Goals:     10/14 10/16 10/21 10/28 10/31 11/4 11/6 11/13     FOTO Comp            Eval/Re-eval IE                         Education                          Manuals             R hip PROM  EDGARD flexion  EDGARD          R hip lat/inferior hip mobs nv EDGARD   EDGARD     nv     R LE LAD  EDGARD EDGARD     nv     Ther Ex             RF/HS stretch nv            LTR 5\"x10 5\"x10 5\"x10 np         SKTC 10\"x5 np  10\"x5 10\"x5 10\"X5 10\"x5 30\"x3B     SLR-flex, abd x10 2x5 2x5   5\"x10 " "5\"x10 5\"x15     Clamshells 5\"x10 np    5\"x10 5\"x10 5\"x10     Bridges 5\"x10 5\"x10 5\"x10 5\"x10 5\"x10 5\"x10 5\"x10 5\"x10     Hip abd/add PTB 5\" x10 Hip ISO 5\" 2x10 Hip ISO 5\" 3x10 Hip ISO 5\" 3x10 Hip ISO 5\" 3x10 Abd OTB 5\" 2x10    Add ISO 5\" x20 Iso 5\" 3x10 Iso 5\" 3x10     Squats  nv            3-way forward flexion   Pball 5\"x10 Pball 5\"x10 Pball flex 5\"x10 Pball flex 5\"x10 Pball flex 5\"x10 Pball flex 5\"x10                                Neuro Re-ed             SLS              Biodex LOS            Tandem             NATHANAEL       x10      Ther Activity             Bike (MHP)  L1 5' L1 6' L1 6' L 8' L1 8' L1 8' L1 6'     SA leg press   44# 2x10          Gait Training                                       Modalities                                                            "

## 2024-11-15 ENCOUNTER — OFFICE VISIT (OUTPATIENT)
Dept: PHYSICAL THERAPY | Facility: REHABILITATION | Age: 69
End: 2024-11-15
Payer: COMMERCIAL

## 2024-11-15 DIAGNOSIS — M25.551 PAIN OF RIGHT HIP: Primary | ICD-10-CM

## 2024-11-15 DIAGNOSIS — M54.16 LUMBAR RADICULOPATHY: ICD-10-CM

## 2024-11-15 PROCEDURE — 97110 THERAPEUTIC EXERCISES: CPT | Performed by: PHYSICAL THERAPIST

## 2024-11-15 NOTE — PROGRESS NOTES
"Daily Note     Today's date: 11/15/2024  Patient name: Padmini Sharma  : 1955  MRN: [id number]  Referring provider: Omar Chavez DO  Dx:   Encounter Diagnosis     ICD-10-CM    1. Pain of right hip  M25.551       2. Lumbar radiculopathy  M54.16           Start Time: 1030  Stop Time: 1108  Total time in clinic (min): 38 minutes    Subjective: Pt reports overall doing well with minimal to nil pain upon arrival.       Objective: See treatment diary below      Assessment: Pt tolerated treatment well this visit. Unable to progress resistance exercises due to previous session noting increase in pain with orange TB. Pt continued to perform hip isometrics with good tolerance noted. Pt did report increase discomfort with SLR-flexion of  RLE. Pt would continue to benefit from skilled PT to address deficits.       Plan: Continue per plan of care.      Precautions:  Patient Active Problem List   Diagnosis    Allergic rhinitis    Arthritis    Depression    Eczema    Neck pain    Glenohumeral arthritis, left    Hyperlipidemia    Essential hypertension    Hypothyroidism    Insomnia    Primary localized osteoarthritis of left knee    Primary localized osteoarthritis of right knee    Vitamin D deficiency    Anxiety    Osteoarthritis of carpometacarpal (CMC) joint of thumb    Acquired trigger finger    Carpal tunnel syndrome    Low back pain    Flu-like symptoms       Patient's Goals:     10/14 10/16 10/21 10/28 10/31 11/4 11/6 11/13 11/15    FOTO Comp            Eval/Re-eval IE                         Education                          Manuals             R hip PROM  EDGARD flexion  EDGARD          R hip lat/inferior hip mobs nv EDGARD   EDGARD     nv     R LE LAD  EDGARD EDGARD     nv     Ther Ex             RF/HS stretch nv            LTR 5\"x10 5\"x10 5\"x10 np         SKTC 10\"x5 np  10\"x5 10\"x5 10\"X5 10\"x5 30\"x3B 30\"x3 ea    SLR-flex, abd x10 2x5 2x5   5\"x10 5\"x10 5\"x15 5\"x15    Clamshells 5\"x10 np    5\"x10 5\"x10 5\"x10 5\"x10    Bridges " "5\"x10 5\"x10 5\"x10 5\"x10 5\"x10 5\"x10 5\"x10 5\"x10 5\"x10    Hip abd/add PTB 5\" x10 Hip ISO 5\" 2x10 Hip ISO 5\" 3x10 Hip ISO 5\" 3x10 Hip ISO 5\" 3x10 Abd OTB 5\" 2x10    Add ISO 5\" x20 Iso 5\" 3x10 Iso 5\" 3x10 ISO 5\" 3x10    Squats  nv            3-way forward flexion   Pball 5\"x10 Pball 5\"x10 Pball flex 5\"x10 Pball flex 5\"x10 Pball flex 5\"x10 Pball flex 5\"x10                                Neuro Re-ed             SLS              Biodex LOS            Tandem             NATHANAEL       x10      Ther Activity             Bike (MHP)  L1 5' L1 6' L1 6' L 8' L1 8' L1 8' L1 6' L1 8'c    SA leg press   44# 2x10          Gait Training                                       Modalities                                                              "

## 2024-11-20 ENCOUNTER — APPOINTMENT (OUTPATIENT)
Dept: PHYSICAL THERAPY | Facility: REHABILITATION | Age: 69
End: 2024-11-20
Payer: COMMERCIAL

## 2024-11-22 ENCOUNTER — APPOINTMENT (OUTPATIENT)
Dept: PHYSICAL THERAPY | Facility: REHABILITATION | Age: 69
End: 2024-11-22
Payer: COMMERCIAL

## 2024-12-16 ENCOUNTER — TELEPHONE (OUTPATIENT)
Age: 69
End: 2024-12-16

## 2024-12-16 NOTE — TELEPHONE ENCOUNTER
Patient called in regards to going to [facility] ED on Saturday, patient stated she was there for a few hours and then they let her go. Patient stated she is still not feel well with nausea and gas. Patient would like providers advise on what should she do.

## 2024-12-17 ENCOUNTER — OFFICE VISIT (OUTPATIENT)
Dept: FAMILY MEDICINE CLINIC | Facility: CLINIC | Age: 69
End: 2024-12-17
Payer: COMMERCIAL

## 2024-12-17 VITALS
DIASTOLIC BLOOD PRESSURE: 88 MMHG | TEMPERATURE: 97.9 F | HEIGHT: 66 IN | OXYGEN SATURATION: 95 % | RESPIRATION RATE: 16 BRPM | SYSTOLIC BLOOD PRESSURE: 178 MMHG | HEART RATE: 100 BPM | BODY MASS INDEX: 26.58 KG/M2 | WEIGHT: 165.4 LBS

## 2024-12-17 DIAGNOSIS — J20.9 BRONCHITIS, ACUTE, WITH BRONCHOSPASM: Primary | ICD-10-CM

## 2024-12-17 DIAGNOSIS — K52.9 GASTROENTERITIS: ICD-10-CM

## 2024-12-17 PROCEDURE — 99214 OFFICE O/P EST MOD 30 MIN: CPT | Performed by: FAMILY MEDICINE

## 2024-12-17 PROCEDURE — G2211 COMPLEX E/M VISIT ADD ON: HCPCS | Performed by: FAMILY MEDICINE

## 2024-12-17 RX ORDER — FAMOTIDINE 20 MG/1
20 TABLET, FILM COATED ORAL 2 TIMES DAILY
Qty: 60 TABLET | Refills: 1 | Status: SHIPPED | OUTPATIENT
Start: 2024-12-17 | End: 2025-12-12

## 2024-12-17 RX ORDER — AZITHROMYCIN 250 MG/1
TABLET, FILM COATED ORAL
Qty: 6 TABLET | Refills: 0 | Status: SHIPPED | OUTPATIENT
Start: 2024-12-17 | End: 2024-12-21

## 2024-12-17 RX ORDER — ALBUTEROL SULFATE 90 UG/1
2 INHALANT RESPIRATORY (INHALATION) EVERY 6 HOURS PRN
Qty: 25.5 G | Refills: 1 | Status: SHIPPED | OUTPATIENT
Start: 2024-12-17

## 2024-12-17 NOTE — PROGRESS NOTES
Assessment/Plan:   Patient started on a Z-Oz and continue albuterol inhaler as needed.  She may take Robitussin and Mucinex as needed.  Patient started on Pepcid 20 mg twice daily and may continue Zofran as needed.  Recommend increased fluids and rest.  Patient to remain well-hydrated and may drink Gatorade or electrolyte solution.  Recommend brat/bland diet.  Return to the office in 1 week or call sooner as needed or report to the emergency room for worsening symptoms.   Diagnoses and all orders for this visit:    Bronchitis, acute, with bronchospasm  -     albuterol (PROVENTIL HFA,VENTOLIN HFA) 90 mcg/act inhaler; Inhale 2 puffs every 6 (six) hours as needed for wheezing  -     azithromycin (ZITHROMAX) 250 mg tablet; Take 2 tablets today then 1 tablet daily x 4 days    Gastroenteritis  -     famotidine (PEPCID) 20 mg tablet; Take 1 tablet (20 mg total) by mouth 2 (two) times a day          Subjective:     Patient ID: Padmini Sharma is a 69 y.o. female.    Patient is being seen in follow-up from recent ER visit at [facility] on 12/14/2024 for dehydration and viral syndrome.  ER records reviewed.  Patient had labs and ruled out for COVID, flu and RSV.  Treated with IV fluids and discharged home with Zofran as needed.  Patient started 8 days ago with nausea, vomiting, diarrhea and cough productive of green mucus.  Symptoms are improved although do persist.    Cough  This is a new problem. The current episode started 1 to 4 weeks ago. The problem has been gradually improving. The cough is Productive of purulent sputum. Associated symptoms include chills, myalgias, postnasal drip, shortness of breath, sweats and wheezing. Pertinent negatives include no ear pain, fever, headaches, hemoptysis, nasal congestion or sore throat. She has tried a beta-agonist inhaler and OTC cough suppressant for the symptoms. The treatment provided moderate relief. Her past medical history is significant for asthma, bronchitis and  pneumonia.   Diarrhea   This is a new problem. The current episode started 1 to 4 weeks ago. The problem occurs 2 to 4 times per day. The problem has been gradually improving. The stool consistency is described as Mucous. The patient states that diarrhea does not awaken her from sleep. Associated symptoms include bloating, chills, coughing, myalgias, sweats, a URI and vomiting. Pertinent negatives include no abdominal pain, fever or headaches. There are no known risk factors. She has tried bismuth subsalicylate and anti-motility drug for the symptoms. The treatment provided moderate relief.   Vomiting   This is a new problem. The current episode started 1 to 4 weeks ago. The problem has been resolved. Associated symptoms include chills, coughing, diarrhea, myalgias, sweats and URI. Pertinent negatives include no abdominal pain, fever or headaches. Treatments tried: zofran. The treatment provided moderate relief.       Review of Systems   Constitutional:  Positive for chills. Negative for fever.   HENT:  Positive for postnasal drip. Negative for ear pain and sore throat.    Respiratory:  Positive for cough, shortness of breath and wheezing. Negative for hemoptysis.    Gastrointestinal:  Positive for bloating, diarrhea and vomiting. Negative for abdominal pain.   Musculoskeletal:  Positive for myalgias.   Neurological:  Negative for headaches.         Objective:     Physical Exam  Constitutional:       General: She is not in acute distress.     Appearance: Normal appearance. She is ill-appearing. She is not toxic-appearing or diaphoretic.   HENT:      Head: Normocephalic.      Right Ear: Tympanic membrane normal.      Left Ear: Tympanic membrane normal.      Nose: Nose normal.      Mouth/Throat:      Mouth: Mucous membranes are moist.      Pharynx: Oropharynx is clear.   Eyes:      General: No scleral icterus.     Conjunctiva/sclera: Conjunctivae normal.   Cardiovascular:      Rate and Rhythm: Normal rate and regular  rhythm.   Pulmonary:      Effort: Pulmonary effort is normal. No respiratory distress.      Breath sounds: No wheezing.      Comments: Coarse breath sounds.  Abdominal:      General: Bowel sounds are normal.      Palpations: Abdomen is soft.      Tenderness: There is no abdominal tenderness. There is no guarding or rebound.   Musculoskeletal:      Cervical back: Neck supple.      Right lower leg: No edema.      Left lower leg: No edema.   Lymphadenopathy:      Cervical: No cervical adenopathy.   Skin:     General: Skin is warm and dry.   Neurological:      General: No focal deficit present.      Mental Status: She is alert and oriented to person, place, and time.   Psychiatric:         Mood and Affect: Mood normal.         Behavior: Behavior normal.         Thought Content: Thought content normal.         Judgment: Judgment normal.

## 2025-01-27 DIAGNOSIS — G47.00 INSOMNIA, UNSPECIFIED TYPE: ICD-10-CM

## 2025-01-27 RX ORDER — ZOLPIDEM TARTRATE 10 MG/1
10 TABLET ORAL
Qty: 90 TABLET | Refills: 0 | Status: SHIPPED | OUTPATIENT
Start: 2025-01-27

## 2025-01-27 NOTE — TELEPHONE ENCOUNTER
zolpidem (AMBIEN) 10 mg tablet         Sig: Take 1 tablet (10 mg total) by mouth daily at bedtime as needed for sleep    Disp: 90 tablet    Refills: 0    Start: 1/27/2025    Class: Normal    PDMP Review May Be Needed    Non-formulary For: Insomnia, unspecified type    Last ordered: 3 months ago (10/28/2024) by Omar Chavez DO    Psychiatry:  Anxiolytics/Hypnotics Yartei2001/27/2025 07:46 AM   Protocol Details This refill cannot be delegated    Valid encounter within last 6 months      To be filled at: [facility] [place], [place] - [address] [place] [address]

## 2025-02-11 DIAGNOSIS — F41.9 ANXIETY: ICD-10-CM

## 2025-02-11 RX ORDER — LORAZEPAM 0.5 MG/1
0.5 TABLET ORAL EVERY 8 HOURS PRN
Qty: 90 TABLET | Refills: 0 | Status: SHIPPED | OUTPATIENT
Start: 2025-02-11

## 2025-02-11 NOTE — TELEPHONE ENCOUNTER
LORazepam (ATIVAN) 0.5 mg tablet          Sig: Take 1 tablet (0.5 mg total) by mouth every 8 (eight) hours as needed for anxiety    Disp: 90 tablet    Refills: 0    Start: 2/11/2025    Class: Normal    PDMP Review May Be Needed    Non-formulary For: Anxiety    Last ordered: 1 year ago (9/19/2023) by Omar Chavez DO    Psychiatry:  Anxiolytics/Hypnotics [id number]/11/2025 12:06 PM   Protocol Details This refill cannot be delegated    Valid encounter within last 6 months      To be filled at: [facility] [place], [place] - [address] [place] [address]

## 2025-04-25 DIAGNOSIS — G47.00 INSOMNIA, UNSPECIFIED TYPE: ICD-10-CM

## 2025-04-25 DIAGNOSIS — J20.9 BRONCHITIS, ACUTE, WITH BRONCHOSPASM: ICD-10-CM

## 2025-04-25 RX ORDER — ZOLPIDEM TARTRATE 10 MG/1
10 TABLET ORAL
Qty: 90 TABLET | Refills: 0 | Status: SHIPPED | OUTPATIENT
Start: 2025-04-25

## 2025-04-25 RX ORDER — ALBUTEROL SULFATE 90 UG/1
2 INHALANT RESPIRATORY (INHALATION) EVERY 6 HOURS PRN
Qty: 25.5 G | Refills: 0 | Status: SHIPPED | OUTPATIENT
Start: 2025-04-25

## 2025-05-28 DIAGNOSIS — F32.A DEPRESSION, UNSPECIFIED DEPRESSION TYPE: ICD-10-CM

## 2025-05-29 RX ORDER — VENLAFAXINE HYDROCHLORIDE 150 MG/1
150 CAPSULE, EXTENDED RELEASE ORAL DAILY
Qty: 90 CAPSULE | Refills: 0 | Status: SHIPPED | OUTPATIENT
Start: 2025-05-29

## 2025-06-03 ENCOUNTER — OFFICE VISIT (OUTPATIENT)
Dept: FAMILY MEDICINE CLINIC | Facility: CLINIC | Age: 70
End: 2025-06-03
Payer: COMMERCIAL

## 2025-06-03 VITALS
RESPIRATION RATE: 16 BRPM | TEMPERATURE: 98.2 F | BODY MASS INDEX: 28.9 KG/M2 | SYSTOLIC BLOOD PRESSURE: 128 MMHG | DIASTOLIC BLOOD PRESSURE: 70 MMHG | WEIGHT: 179.8 LBS | HEART RATE: 88 BPM | OXYGEN SATURATION: 98 % | HEIGHT: 66 IN

## 2025-06-03 DIAGNOSIS — M17.11 PRIMARY LOCALIZED OSTEOARTHRITIS OF RIGHT KNEE: ICD-10-CM

## 2025-06-03 DIAGNOSIS — M18.0 PRIMARY OSTEOARTHRITIS OF BOTH FIRST CARPOMETACARPAL JOINTS: ICD-10-CM

## 2025-06-03 DIAGNOSIS — K52.9 GASTROENTERITIS: ICD-10-CM

## 2025-06-03 DIAGNOSIS — I10 ESSENTIAL HYPERTENSION: ICD-10-CM

## 2025-06-03 DIAGNOSIS — E78.2 MIXED HYPERLIPIDEMIA: ICD-10-CM

## 2025-06-03 DIAGNOSIS — M19.90 ARTHRITIS: Primary | ICD-10-CM

## 2025-06-03 DIAGNOSIS — M17.12 PRIMARY LOCALIZED OSTEOARTHRITIS OF LEFT KNEE: ICD-10-CM

## 2025-06-03 DIAGNOSIS — E55.9 VITAMIN D DEFICIENCY: ICD-10-CM

## 2025-06-03 PROCEDURE — 99213 OFFICE O/P EST LOW 20 MIN: CPT | Performed by: FAMILY MEDICINE

## 2025-06-03 RX ORDER — RIBOFLAVIN (VITAMIN B2) 100 MG
1 TABLET ORAL 3 TIMES DAILY
COMMUNITY

## 2025-06-03 RX ORDER — ZINC GLUCONATE 50 MG
50 TABLET ORAL DAILY
COMMUNITY

## 2025-06-03 RX ORDER — FAMOTIDINE 20 MG/1
20 TABLET, FILM COATED ORAL 2 TIMES DAILY PRN
Qty: 60 TABLET | Refills: 1 | Status: SHIPPED | OUTPATIENT
Start: 2025-06-03 | End: 2026-05-29

## 2025-06-03 RX ORDER — MULTIVITAMIN WITH IRON
100 TABLET ORAL DAILY
COMMUNITY

## 2025-06-03 NOTE — PROGRESS NOTES
":  Assessment & Plan  Arthritis  Patient is being referred to Dr. Loaiza, rheumatologist for further evaluation and treatment.  Patient may take ibuprofen alternating with Tylenol although recommend she limit the use of either.  Orders:    Ambulatory Referral to Rheumatology; Future    Primary localized osteoarthritis of right knee    Orders:    Ambulatory Referral to Rheumatology; Future    Primary localized osteoarthritis of left knee    Orders:    Ambulatory Referral to Rheumatology; Future    Primary osteoarthritis of both first carpometacarpal joints    Orders:    Ambulatory Referral to Rheumatology; Future    Gastroenteritis    Orders:    famotidine (PEPCID) 20 mg tablet; Take 1 tablet (20 mg total) by mouth 2 (two) times a day as needed for heartburn    Essential hypertension  Patient to complete fasting labs as below.  Orders:    CBC; Future    Comprehensive metabolic panel; Future    TSH, 3rd generation with Free T4 reflex; Future    UA w Reflex to Microscopic w Reflex to Culture; Future    Mixed hyperlipidemia    Orders:    Comprehensive metabolic panel; Future    Lipid Panel with Direct LDL reflex; Future    Vitamin D deficiency    Orders:    Vitamin D 25 hydroxy; Future        History of Present Illness     Padmini Sharma is a 69 y.o. female   Patient complains of worsening arthritic joint pains involving her hips, knees, ankles and hands bilaterally.  Patient has been taking ibuprofen with some relief.  She had tried meloxicam in the past without relief.  Patient saw a rheumatologist in the past but not recently.  Patient also requests checking her thyroid function.  Patient tried rosuvastatin last year but discontinued it secondary to muscle aches.    Hip Pain     Ankle Pain     Knee Pain     Hand Pain       Review of Systems  Objective   /70   Pulse 88   Temp 98.2 °F (36.8 °C)   Resp 16   Ht 5' 6\" (1.676 m)   Wt 81.6 kg (179 lb 12.8 oz)   SpO2 98%   BMI 29.02 kg/m²      Physical " Exam  Constitutional:       General: She is not in acute distress.     Appearance: Normal appearance.   HENT:      Head: Normocephalic.      Mouth/Throat:      Mouth: Mucous membranes are moist.     Eyes:      General: No scleral icterus.     Conjunctiva/sclera: Conjunctivae normal.     Neck:      Vascular: No carotid bruit.     Cardiovascular:      Rate and Rhythm: Normal rate and regular rhythm.   Pulmonary:      Effort: Pulmonary effort is normal.      Breath sounds: Normal breath sounds.   Abdominal:      Palpations: Abdomen is soft.      Tenderness: There is no abdominal tenderness.     Musculoskeletal:         General: Tenderness present.      Cervical back: Neck supple.      Right lower leg: No edema.      Left lower leg: No edema.   Lymphadenopathy:      Cervical: No cervical adenopathy.     Skin:     General: Skin is warm and dry.     Neurological:      General: No focal deficit present.      Mental Status: She is alert and oriented to person, place, and time.     Psychiatric:         Mood and Affect: Mood normal.         Behavior: Behavior normal.         Thought Content: Thought content normal.         Judgment: Judgment normal.

## 2025-06-03 NOTE — ASSESSMENT & PLAN NOTE
Patient is being referred to Dr. Loaiza, rheumatologist for further evaluation and treatment.  Patient may take ibuprofen alternating with Tylenol although recommend she limit the use of either.  Orders:    Ambulatory Referral to Rheumatology; Future

## 2025-06-03 NOTE — ASSESSMENT & PLAN NOTE
Patient to complete fasting labs as below.  Orders:    CBC; Future    Comprehensive metabolic panel; Future    TSH, 3rd generation with Free T4 reflex; Future    UA w Reflex to Microscopic w Reflex to Culture; Future

## 2025-06-05 ENCOUNTER — RESULTS FOLLOW-UP (OUTPATIENT)
Dept: FAMILY MEDICINE CLINIC | Facility: CLINIC | Age: 70
End: 2025-06-05

## 2025-06-05 ENCOUNTER — APPOINTMENT (OUTPATIENT)
Dept: LAB | Facility: IMAGING CENTER | Age: 70
End: 2025-06-05
Payer: COMMERCIAL

## 2025-06-05 DIAGNOSIS — I10 ESSENTIAL HYPERTENSION: ICD-10-CM

## 2025-06-05 DIAGNOSIS — E78.2 MIXED HYPERLIPIDEMIA: ICD-10-CM

## 2025-06-05 DIAGNOSIS — E55.9 VITAMIN D DEFICIENCY: ICD-10-CM

## 2025-06-05 LAB
25(OH)D3 SERPL-MCNC: 27.6 NG/ML (ref 30–100)
ALBUMIN SERPL BCG-MCNC: 4.4 G/DL (ref 3.5–5)
ALP SERPL-CCNC: 75 U/L (ref 34–104)
ALT SERPL W P-5'-P-CCNC: 15 U/L (ref 7–52)
ANION GAP SERPL CALCULATED.3IONS-SCNC: 8 MMOL/L (ref 4–13)
AST SERPL W P-5'-P-CCNC: 14 U/L (ref 13–39)
BACTERIA UR QL AUTO: ABNORMAL /HPF
BILIRUB DIRECT SERPL-MCNC: 0.06 MG/DL (ref 0–0.2)
BILIRUB SERPL-MCNC: 0.5 MG/DL (ref 0.2–1)
BILIRUB UR QL STRIP: NEGATIVE
BUN SERPL-MCNC: 20 MG/DL (ref 5–25)
CALCIUM SERPL-MCNC: 9.2 MG/DL (ref 8.4–10.2)
CHLORIDE SERPL-SCNC: 103 MMOL/L (ref 96–108)
CHOLEST SERPL-MCNC: 278 MG/DL (ref ?–200)
CLARITY UR: CLEAR
CO2 SERPL-SCNC: 29 MMOL/L (ref 21–32)
COLOR UR: ABNORMAL
CREAT SERPL-MCNC: 0.62 MG/DL (ref 0.6–1.3)
ERYTHROCYTE [DISTWIDTH] IN BLOOD BY AUTOMATED COUNT: 12.1 % (ref 11.6–15.1)
GFR SERPL CREATININE-BSD FRML MDRD: 91 ML/MIN/1.73SQ M
GLUCOSE P FAST SERPL-MCNC: 92 MG/DL (ref 65–99)
GLUCOSE UR STRIP-MCNC: NEGATIVE MG/DL
HCT VFR BLD AUTO: 41.1 % (ref 34.8–46.1)
HDLC SERPL-MCNC: 49 MG/DL
HGB BLD-MCNC: 13.5 G/DL (ref 11.5–15.4)
HGB UR QL STRIP.AUTO: NEGATIVE
HYALINE CASTS #/AREA URNS LPF: ABNORMAL /LPF
KETONES UR STRIP-MCNC: NEGATIVE MG/DL
LDLC SERPL CALC-MCNC: 181 MG/DL (ref 0–100)
LEUKOCYTE ESTERASE UR QL STRIP: ABNORMAL
MCH RBC QN AUTO: 31.3 PG (ref 26.8–34.3)
MCHC RBC AUTO-ENTMCNC: 32.8 G/DL (ref 31.4–37.4)
MCV RBC AUTO: 95 FL (ref 82–98)
NITRITE UR QL STRIP: POSITIVE
NON-SQ EPI CELLS URNS QL MICRO: ABNORMAL /HPF
PH UR STRIP.AUTO: 6 [PH]
PLATELET # BLD AUTO: 288 THOUSANDS/UL (ref 149–390)
PMV BLD AUTO: 10.9 FL (ref 8.9–12.7)
POTASSIUM SERPL-SCNC: 4.6 MMOL/L (ref 3.5–5.3)
PROT SERPL-MCNC: 6.6 G/DL (ref 6.4–8.4)
PROT UR STRIP-MCNC: NEGATIVE MG/DL
RBC # BLD AUTO: 4.32 MILLION/UL (ref 3.81–5.12)
RBC #/AREA URNS AUTO: ABNORMAL /HPF
SODIUM SERPL-SCNC: 140 MMOL/L (ref 135–147)
SP GR UR STRIP.AUTO: 1.01 (ref 1–1.03)
TRIGL SERPL-MCNC: 239 MG/DL (ref ?–150)
TSH SERPL DL<=0.05 MIU/L-ACNC: 1.12 UIU/ML (ref 0.45–4.5)
UROBILINOGEN UR STRIP-ACNC: <2 MG/DL
WBC # BLD AUTO: 6.6 THOUSAND/UL (ref 4.31–10.16)
WBC #/AREA URNS AUTO: ABNORMAL /HPF

## 2025-06-05 PROCEDURE — 80053 COMPREHEN METABOLIC PANEL: CPT

## 2025-06-05 PROCEDURE — 85027 COMPLETE CBC AUTOMATED: CPT

## 2025-06-05 PROCEDURE — 81001 URINALYSIS AUTO W/SCOPE: CPT

## 2025-06-05 PROCEDURE — 80061 LIPID PANEL: CPT

## 2025-06-05 PROCEDURE — 82306 VITAMIN D 25 HYDROXY: CPT

## 2025-06-05 PROCEDURE — 84443 ASSAY THYROID STIM HORMONE: CPT

## 2025-06-05 PROCEDURE — 82248 BILIRUBIN DIRECT: CPT

## 2025-06-05 PROCEDURE — 36415 COLL VENOUS BLD VENIPUNCTURE: CPT

## 2025-06-06 DIAGNOSIS — E78.2 MIXED HYPERLIPIDEMIA: Primary | ICD-10-CM

## 2025-06-06 RX ORDER — PRAVASTATIN SODIUM 10 MG
10 TABLET ORAL
Qty: 30 TABLET | Refills: 5 | Status: SHIPPED | OUTPATIENT
Start: 2025-06-06

## 2025-07-16 ENCOUNTER — VBI (OUTPATIENT)
Dept: ADMINISTRATIVE | Facility: OTHER | Age: 70
End: 2025-07-16

## 2025-07-16 NOTE — TELEPHONE ENCOUNTER
07/16/25 11:46 AM     Chart reviewed for CRC: Colonoscopy ; nothing is submitted to the patient's insurance at this time.     Jenn Farias MA   [facility]

## 2025-07-28 DIAGNOSIS — G47.00 INSOMNIA, UNSPECIFIED TYPE: ICD-10-CM

## 2025-07-28 DIAGNOSIS — J20.9 BRONCHITIS, ACUTE, WITH BRONCHOSPASM: ICD-10-CM

## 2025-07-29 RX ORDER — ALBUTEROL SULFATE 90 UG/1
2 INHALANT RESPIRATORY (INHALATION) EVERY 6 HOURS PRN
Qty: 25.5 G | Refills: 1 | Status: SHIPPED | OUTPATIENT
Start: 2025-07-29

## 2025-07-30 RX ORDER — ZOLPIDEM TARTRATE 10 MG/1
10 TABLET ORAL
Qty: 90 TABLET | Refills: 0 | Status: SHIPPED | OUTPATIENT
Start: 2025-07-30